# Patient Record
Sex: FEMALE | Race: BLACK OR AFRICAN AMERICAN | Employment: UNEMPLOYED | URBAN - METROPOLITAN AREA
[De-identification: names, ages, dates, MRNs, and addresses within clinical notes are randomized per-mention and may not be internally consistent; named-entity substitution may affect disease eponyms.]

---

## 2017-05-03 ENCOUNTER — HOSPITAL ENCOUNTER (EMERGENCY)
Age: 57
Discharge: HOME | End: 2017-05-03
Attending: INTERNAL MEDICINE
Payer: MEDICARE

## 2017-05-03 ENCOUNTER — APPOINTMENT (OUTPATIENT)
Dept: GENERAL RADIOLOGY | Age: 57
End: 2017-05-03
Attending: INTERNAL MEDICINE
Payer: MEDICARE

## 2017-05-03 VITALS
OXYGEN SATURATION: 98 % | RESPIRATION RATE: 18 BRPM | HEIGHT: 67 IN | SYSTOLIC BLOOD PRESSURE: 134 MMHG | HEART RATE: 93 BPM | WEIGHT: 125 LBS | TEMPERATURE: 97.9 F | BODY MASS INDEX: 19.62 KG/M2 | DIASTOLIC BLOOD PRESSURE: 97 MMHG

## 2017-05-03 DIAGNOSIS — S90.31XA CONTUSION OF RIGHT FOOT, INITIAL ENCOUNTER: Primary | ICD-10-CM

## 2017-05-03 PROCEDURE — 73630 X-RAY EXAM OF FOOT: CPT

## 2017-05-03 PROCEDURE — 99282 EMERGENCY DEPT VISIT SF MDM: CPT

## 2017-05-03 RX ORDER — LATANOPROST 50 UG/ML
1 SOLUTION/ DROPS OPHTHALMIC
COMMUNITY

## 2017-05-03 RX ORDER — NAPROXEN SODIUM 220 MG
440 TABLET ORAL
Qty: 20 TAB | Refills: 0 | Status: SHIPPED | OUTPATIENT
Start: 2017-05-03

## 2017-05-03 NOTE — ED PROVIDER NOTES
Patient is a 55 y.o. female presenting with foot pain. The history is provided by the patient. Foot Pain    This is a new (Acute Rt lateral foot pain after hitting it on the corner of a bed yesterday. ) problem. The current episode started yesterday. The problem occurs constantly. The problem has been gradually improving. The pain is present in the right foot. The pain is at a severity of 8/10. Pertinent negatives include no numbness, full range of motion, no stiffness, no tingling, no itching, no back pain and no neck pain. The symptoms are aggravated by palpation, contact and movement. Treatments tried: Aleve this AM. The treatment provided moderate relief. There has been a history of trauma. Past Medical History:   Diagnosis Date    Glaucoma     Hypertension     Ill-defined condition     legally blind/glaucoma    Osteoarthritis        Past Surgical History:   Procedure Laterality Date    HX GYN          HX ORTHOPAEDIC      Foot surgery         Family History:   Problem Relation Age of Onset    Hypertension Mother     Hypertension Father     Hypertension Maternal Grandmother     Hypertension Maternal Grandfather        Social History     Social History    Marital status: SINGLE     Spouse name: N/A    Number of children: N/A    Years of education: N/A     Occupational History    Not on file. Social History Main Topics    Smoking status: Current Every Day Smoker     Packs/day: 0.25    Smokeless tobacco: Not on file    Alcohol use Yes      Comment: socially    Drug use: No    Sexual activity: Not on file     Other Topics Concern    Not on file     Social History Narrative    ** Merged History Encounter **              ALLERGIES: Review of patient's allergies indicates no known allergies. Review of Systems   Constitutional: Negative for activity change, appetite change, chills, diaphoresis, fatigue and fever. HENT: Negative. Eyes: Negative. Respiratory: Negative. Negative for cough and shortness of breath. Cardiovascular: Negative. Negative for chest pain and leg swelling. Gastrointestinal: Negative. Negative for abdominal pain, diarrhea, nausea and vomiting. Genitourinary: Negative. Musculoskeletal: Positive for arthralgias and gait problem. Negative for back pain, joint swelling, myalgias, neck pain, neck stiffness and stiffness. Skin: Negative. Negative for color change, itching, pallor and wound. Neurological: Negative for tingling and numbness. Psychiatric/Behavioral: Negative. Vitals:    05/03/17 1818   BP: (!) 134/97   Pulse: 93   Resp: 18   Temp: 97.9 °F (36.6 °C)   SpO2: 98%   Weight: 56.7 kg (125 lb)   Height: 5' 7\" (1.702 m)            Physical Exam   Constitutional: She is oriented to person, place, and time. She appears well-developed and well-nourished. No distress. HENT:   Head: Normocephalic and atraumatic. Right Ear: External ear normal.   Left Ear: External ear normal.   Nose: Nose normal.   Eyes: Conjunctivae and EOM are normal. Pupils are equal, round, and reactive to light. Neck: Normal range of motion. Neck supple. Cardiovascular: Normal rate, regular rhythm, normal heart sounds and intact distal pulses. Pulmonary/Chest: Effort normal and breath sounds normal.   Abdominal: Soft. Bowel sounds are normal. There is no tenderness. Musculoskeletal: Normal range of motion. She exhibits tenderness. She exhibits no edema or deformity. Right ankle: Normal.        Right foot: There is tenderness. There is normal range of motion, no bony tenderness, no swelling, normal capillary refill, no crepitus, no deformity and no laceration. Left foot: Normal.        Feet:    Neurological: She is alert and oriented to person, place, and time. No cranial nerve deficit. Skin: Skin is warm and dry. She is not diaphoretic. Psychiatric: She has a normal mood and affect.  Her behavior is normal. Judgment and thought content normal.   Nursing note and vitals reviewed. MDM  Number of Diagnoses or Management Options  Contusion of right foot, initial encounter:   Diagnosis management comments: DDx: contusion, sprain, fx, dislocation    LABORATORY TESTS:  No results found for this or any previous visit (from the past 12 hour(s)). IMAGING RESULTS:  XR FOOT RT MIN 3 V   Final Result       MEDICATIONS GIVEN:  Medications - No data to display    IMPRESSION:  Contusion of right foot, initial encounter  (primary encounter diagnosis)    PLAN:  1. Current Discharge Medication List    START taking these medications    naproxen sodium (ALEVE) 220 mg tablet  Take 2 Tabs by mouth every eight (8) hours as needed for Pain. Qty: 20 Tab Refills: 0      CONTINUE these medications which have NOT CHANGED    latanoprost (XALATAN) 0.005 % ophthalmic solution  Administer 1 Drop to both eyes nightly.    gabapentin (NEURONTIN) 300 mg capsule  Take 300 mg by mouth three (3) times daily. amLODIPine (NORVASC) 5 mg tablet  Take 1 Tab by mouth daily. Qty: 30 Tab Refills: 0    diclofenac EC (VOLTAREN) 75 mg EC tablet  Take 1 Tab by mouth two (2) times daily as needed. Indications: OSTEOARTHRITIS  Qty: 30 Tab Refills: 0    meclizine (ANTIVERT) 25 mg tablet  Take 1 Tab by mouth three (3) times daily as needed. Indications: VERTIGO  Qty: 15 Tab Refills: 0    hydrochlorothiazide (HYDRODIURIL) 25 mg tablet  Take 25 mg by mouth daily. 2. Follow-up Information     Follow up With Details Comments 10 Brianda Alexandre, NP Schedule an appointment as soon as possible for a   visit in 1 week As needed, If symptoms worsen 5 [facility]  [phone]        Return to ED if worse               Patient Progress  Patient progress: stable    ED Course       Procedures    6:54 PM  I have discussed with patient their diagnosis, treatment, and follow up plan.  The patient agrees to follow up as outlined in discharge paperwork and also to return to the ED with any worsening.  Mitzi Thomas PA-C

## 2017-05-03 NOTE — ED TRIAGE NOTES
Right foot pain since hitting it yesterday on metal bedframe, no obvious deformities, last took naproxen earlier today/AM, PMH of multiple foot surgeries and fractures, pt also legally blind

## 2017-05-03 NOTE — DISCHARGE INSTRUCTIONS

## 2021-05-20 ENCOUNTER — APPOINTMENT (OUTPATIENT)
Dept: CT IMAGING | Age: 61
End: 2021-05-20
Attending: EMERGENCY MEDICINE
Payer: MEDICARE

## 2021-05-20 ENCOUNTER — HOSPITAL ENCOUNTER (EMERGENCY)
Age: 61
Discharge: HOME | End: 2021-05-20
Attending: EMERGENCY MEDICINE
Payer: MEDICARE

## 2021-05-20 VITALS
DIASTOLIC BLOOD PRESSURE: 95 MMHG | WEIGHT: 120 LBS | BODY MASS INDEX: 18.83 KG/M2 | HEART RATE: 82 BPM | HEIGHT: 67 IN | RESPIRATION RATE: 18 BRPM | SYSTOLIC BLOOD PRESSURE: 142 MMHG | TEMPERATURE: 97.8 F | OXYGEN SATURATION: 95 %

## 2021-05-20 DIAGNOSIS — R10.9 RIGHT FLANK PAIN: Primary | ICD-10-CM

## 2021-05-20 LAB
ALBUMIN SERPL-MCNC: 4.1 G/DL (ref 3.5–5)
ALBUMIN/GLOB SERPL: 0.8 {RATIO} (ref 1.1–2.2)
ALP SERPL-CCNC: 88 U/L (ref 45–117)
ALT SERPL-CCNC: 17 U/L (ref 12–78)
ANION GAP SERPL CALC-SCNC: 4 MMOL/L (ref 5–15)
APPEARANCE UR: CLEAR
AST SERPL-CCNC: 27 U/L (ref 15–37)
BASOPHILS # BLD: 0 K/UL (ref 0–0.1)
BASOPHILS NFR BLD: 1 % (ref 0–1)
BILIRUB SERPL-MCNC: 0.4 MG/DL (ref 0.2–1)
BILIRUB UR QL: NEGATIVE
BUN SERPL-MCNC: 12 MG/DL (ref 6–20)
BUN/CREAT SERPL: 13 (ref 12–20)
CALCIUM SERPL-MCNC: 9.6 MG/DL (ref 8.5–10.1)
CHLORIDE SERPL-SCNC: 104 MMOL/L (ref 97–108)
CO2 SERPL-SCNC: 30 MMOL/L (ref 21–32)
COLOR UR: NORMAL
COMMENT, HOLDF: NORMAL
CREAT SERPL-MCNC: 0.93 MG/DL (ref 0.55–1.02)
DIFFERENTIAL METHOD BLD: NORMAL
EOSINOPHIL # BLD: 0 K/UL (ref 0–0.4)
EOSINOPHIL NFR BLD: 1 % (ref 0–7)
ERYTHROCYTE [DISTWIDTH] IN BLOOD BY AUTOMATED COUNT: 12.2 % (ref 11.5–14.5)
GLOBULIN SER CALC-MCNC: 5 G/DL (ref 2–4)
GLUCOSE SERPL-MCNC: 89 MG/DL (ref 65–100)
GLUCOSE UR STRIP.AUTO-MCNC: NEGATIVE MG/DL
HCT VFR BLD AUTO: 42 % (ref 35–47)
HGB BLD-MCNC: 14.2 G/DL (ref 11.5–16)
HGB UR QL STRIP: NEGATIVE
IMM GRANULOCYTES # BLD AUTO: 0 K/UL (ref 0–0.04)
IMM GRANULOCYTES NFR BLD AUTO: 0 % (ref 0–0.5)
KETONES UR QL STRIP.AUTO: NEGATIVE MG/DL
LEUKOCYTE ESTERASE UR QL STRIP.AUTO: NEGATIVE
LIPASE SERPL-CCNC: 129 U/L (ref 73–393)
LYMPHOCYTES # BLD: 1.6 K/UL (ref 0.8–3.5)
LYMPHOCYTES NFR BLD: 42 % (ref 12–49)
MCH RBC QN AUTO: 32 PG (ref 26–34)
MCHC RBC AUTO-ENTMCNC: 33.8 G/DL (ref 30–36.5)
MCV RBC AUTO: 94.6 FL (ref 80–99)
MONOCYTES # BLD: 0.3 K/UL (ref 0–1)
MONOCYTES NFR BLD: 8 % (ref 5–13)
NEUTS SEG # BLD: 1.8 K/UL (ref 1.8–8)
NEUTS SEG NFR BLD: 48 % (ref 32–75)
NITRITE UR QL STRIP.AUTO: NEGATIVE
NRBC # BLD: 0 K/UL (ref 0–0.01)
NRBC BLD-RTO: 0 PER 100 WBC
PH UR STRIP: 7 [PH] (ref 5–8)
PLATELET # BLD AUTO: 371 K/UL (ref 150–400)
PMV BLD AUTO: 10.5 FL (ref 8.9–12.9)
POTASSIUM SERPL-SCNC: 4.8 MMOL/L (ref 3.5–5.1)
PROT SERPL-MCNC: 9.1 G/DL (ref 6.4–8.2)
PROT UR STRIP-MCNC: NEGATIVE MG/DL
RBC # BLD AUTO: 4.44 M/UL (ref 3.8–5.2)
SAMPLES BEING HELD,HOLD: NORMAL
SODIUM SERPL-SCNC: 138 MMOL/L (ref 136–145)
SP GR UR REFRACTOMETRY: 1.01 (ref 1–1.03)
UR CULT HOLD, URHOLD: NORMAL
UROBILINOGEN UR QL STRIP.AUTO: 0.2 EU/DL (ref 0.2–1)
WBC # BLD AUTO: 3.8 K/UL (ref 3.6–11)

## 2021-05-20 PROCEDURE — 83690 ASSAY OF LIPASE: CPT

## 2021-05-20 PROCEDURE — 85025 COMPLETE CBC W/AUTO DIFF WBC: CPT

## 2021-05-20 PROCEDURE — 74176 CT ABD & PELVIS W/O CONTRAST: CPT

## 2021-05-20 PROCEDURE — 99283 EMERGENCY DEPT VISIT LOW MDM: CPT

## 2021-05-20 PROCEDURE — 36415 COLL VENOUS BLD VENIPUNCTURE: CPT

## 2021-05-20 PROCEDURE — 80053 COMPREHEN METABOLIC PANEL: CPT

## 2021-05-20 PROCEDURE — 81003 URINALYSIS AUTO W/O SCOPE: CPT

## 2021-05-20 NOTE — ED PROVIDER NOTES
Date of Service:  2021    Patient:  Adriane Kelly    Chief Complaint:  Abdominal Pain       HPI:  dAriane Kelly is a 62 y.o.  female who presents for evaluation of right flank pain on/off for four days. Describes the pain as 10/10 when present. Right flank pain with radiation down to right inguinal region. No blood when she goes to the bathroom. No nausea or vomiting, no fevers. Some loose bowel movements associated with it. Patient endorsees ETOH use. Had pain once like this years ago with UTI, but does not currently have dysuria but endorses frequency. Past Medical History:   Diagnosis Date    Glaucoma     Hypertension     Ill-defined condition     legally blind/glaucoma    Osteoarthritis        Past Surgical History:   Procedure Laterality Date    HX GYN          HX ORTHOPAEDIC      Foot surgery         Family History:   Problem Relation Age of Onset    Hypertension Mother     Hypertension Father     Hypertension Maternal Grandmother     Hypertension Maternal Grandfather        Social History     Socioeconomic History    Marital status: SINGLE     Spouse name: Not on file    Number of children: Not on file    Years of education: Not on file    Highest education level: Not on file   Occupational History    Not on file   Tobacco Use    Smoking status: Current Every Day Smoker     Packs/day: 0.25    Smokeless tobacco: Never Used   Substance and Sexual Activity    Alcohol use: Yes     Comment: socially    Drug use: No    Sexual activity: Not on file   Other Topics Concern    Not on file   Social History Narrative    ** Merged History Encounter **          Social Determinants of Health     Financial Resource Strain:     Difficulty of Paying Living Expenses:    Food Insecurity:     Worried About Running Out of Food in the Last Year:     Ran Out of Food in the Last Year:    Transportation Needs:     Lack of Transportation (Medical):      Lack of Transportation (Non-Medical): Physical Activity:     Days of Exercise per Week:     Minutes of Exercise per Session:    Stress:     Feeling of Stress :    Social Connections:     Frequency of Communication with Friends and Family:     Frequency of Social Gatherings with Friends and Family:     Attends Shinto Services:     Active Member of Clubs or Organizations:     Attends Club or Organization Meetings:     Marital Status:    Intimate Partner Violence:     Fear of Current or Ex-Partner:     Emotionally Abused:     Physically Abused:     Sexually Abused: ALLERGIES: Patient has no known allergies. Review of Systems   Constitutional: Negative for fever. HENT: Negative for hearing loss. Eyes: Negative for visual disturbance. Respiratory: Negative for shortness of breath. Cardiovascular: Negative for chest pain. Gastrointestinal: Positive for abdominal pain and diarrhea. Negative for constipation, nausea and vomiting. Genitourinary: Positive for frequency. Negative for dysuria and flank pain. Musculoskeletal: Negative for back pain. Skin: Negative for rash. Neurological: Negative for dizziness and light-headedness. Psychiatric/Behavioral: Negative for confusion. Vitals:    05/20/21 1131   BP: (!) 142/95   Pulse: 82   Resp: 18   Temp: 97.8 °F (36.6 °C)   SpO2: 95%   Weight: 54.4 kg (120 lb)   Height: 5' 7\" (1.702 m)            Physical Exam  Vitals and nursing note reviewed. Constitutional:       Appearance: She is well-developed. HENT:      Head: Normocephalic and atraumatic. Mouth/Throat:      Mouth: Mucous membranes are moist.   Cardiovascular:      Rate and Rhythm: Normal rate. Heart sounds: Normal heart sounds. Pulmonary:      Effort: Pulmonary effort is normal. No respiratory distress. Abdominal:      General: Abdomen is flat. Bowel sounds are normal.      Palpations: Abdomen is soft. Tenderness: There is no abdominal tenderness (pain currently 0/10).  Negative signs include Saldivar's sign and McBurney's sign. Skin:     General: Skin is warm. Capillary Refill: Capillary refill takes less than 2 seconds. Findings: No rash. Neurological:      Mental Status: She is alert and oriented to person, place, and time. Psychiatric:         Mood and Affect: Mood normal.          MDM  Number of Diagnoses or Management Options       VITAL SIGNS:  No data found. LABS:  Recent Results (from the past 6 hour(s))   CBC WITH AUTOMATED DIFF    Collection Time: 05/20/21 12:08 PM   Result Value Ref Range    WBC 3.8 3.6 - 11.0 K/uL    RBC 4.44 3.80 - 5.20 M/uL    HGB 14.2 11.5 - 16.0 g/dL    HCT 42.0 35.0 - 47.0 %    MCV 94.6 80.0 - 99.0 FL    MCH 32.0 26.0 - 34.0 PG    MCHC 33.8 30.0 - 36.5 g/dL    RDW 12.2 11.5 - 14.5 %    PLATELET 268 786 - 450 K/uL    MPV 10.5 8.9 - 12.9 FL    NRBC 0.0 0  WBC    ABSOLUTE NRBC 0.00 0.00 - 0.01 K/uL    NEUTROPHILS 48 32 - 75 %    LYMPHOCYTES 42 12 - 49 %    MONOCYTES 8 5 - 13 %    EOSINOPHILS 1 0 - 7 %    BASOPHILS 1 0 - 1 %    IMMATURE GRANULOCYTES 0 0.0 - 0.5 %    ABS. NEUTROPHILS 1.8 1.8 - 8.0 K/UL    ABS. LYMPHOCYTES 1.6 0.8 - 3.5 K/UL    ABS. MONOCYTES 0.3 0.0 - 1.0 K/UL    ABS. EOSINOPHILS 0.0 0.0 - 0.4 K/UL    ABS. BASOPHILS 0.0 0.0 - 0.1 K/UL    ABS. IMM. GRANS. 0.0 0.00 - 0.04 K/UL    DF AUTOMATED     METABOLIC PANEL, COMPREHENSIVE    Collection Time: 05/20/21 12:08 PM   Result Value Ref Range    Sodium 138 136 - 145 mmol/L    Potassium 4.8 3.5 - 5.1 mmol/L    Chloride 104 97 - 108 mmol/L    CO2 30 21 - 32 mmol/L    Anion gap 4 (L) 5 - 15 mmol/L    Glucose 89 65 - 100 mg/dL    BUN 12 6 - 20 MG/DL    Creatinine 0.93 0.55 - 1.02 MG/DL    BUN/Creatinine ratio 13 12 - 20      GFR est AA >60 >60 ml/min/1.73m2    GFR est non-AA >60 >60 ml/min/1.73m2    Calcium 9.6 8.5 - 10.1 MG/DL    Bilirubin, total 0.4 0.2 - 1.0 MG/DL    ALT (SGPT) 17 12 - 78 U/L    AST (SGOT) 27 15 - 37 U/L    Alk.  phosphatase 88 45 - 117 U/L    Protein, total 9.1 (H) 6.4 - 8.2 g/dL    Albumin 4.1 3.5 - 5.0 g/dL    Globulin 5.0 (H) 2.0 - 4.0 g/dL    A-G Ratio 0.8 (L) 1.1 - 2.2     SAMPLES BEING HELD    Collection Time: 05/20/21 12:08 PM   Result Value Ref Range    SAMPLES BEING HELD 1 RED, 1 UA     COMMENT        Add-on orders for these samples will be processed based on acceptable specimen integrity and analyte stability, which may vary by analyte. URINE CULTURE HOLD SAMPLE    Collection Time: 05/20/21 12:08 PM    Specimen: Serum   Result Value Ref Range    Urine culture hold        Urine on hold in Microbiology dept for 2 days. If unpreserved urine is submitted, it cannot be used for addtional testing after 24 hours, recollection will be required. LIPASE    Collection Time: 05/20/21 12:08 PM   Result Value Ref Range    Lipase 129 73 - 393 U/L   URINALYSIS W/ RFLX MICROSCOPIC    Collection Time: 05/20/21 12:08 PM   Result Value Ref Range    Color YELLOW/STRAW      Appearance CLEAR CLEAR      Specific gravity 1.013 1.003 - 1.030      pH (UA) 7.0 5.0 - 8.0      Protein Negative NEG mg/dL    Glucose Negative NEG mg/dL    Ketone Negative NEG mg/dL    Bilirubin Negative NEG      Blood Negative NEG      Urobilinogen 0.2 0.2 - 1.0 EU/dL    Nitrites Negative NEG      Leukocyte Esterase Negative NEG          IMAGING:  CT ABD PELV WO CONT   Final Result   2 mm nonobstructing right renal calculus. Medications During Visit:  Medications - No data to display      DECISION MAKING:  Betsy Velazquez is a 62 y.o. female who comes in as above. Here, patient's physical exam grossly unremarkable. Work-up grossly unremarkable with exception of a small stone in her kidney. Possible that she did have a stone that since passed given the distribution of her discomfort and the lack of symptoms at this time. Patient to return as needed, follow-up with PCP      IMPRESSION:  1.  Right flank pain        DISPOSITION:  Discharged      Discharge Medication List as of 5/20/2021  2:40 PM Follow-up Information     Follow up With Specialties Details Why Contact Info    Provided PCP  Schedule an appointment as soon as possible for a visit               The patient is asked to follow-up with their primary care provider in the next several days. They are to call tomorrow for an appointment. The patient is asked to return promptly for any increased concerns or worsening of symptoms. They can return to this emergency department or any other emergency department.     Procedures

## 2023-05-21 RX ORDER — GABAPENTIN 300 MG/1
300 CAPSULE ORAL 3 TIMES DAILY
COMMUNITY

## 2023-05-21 RX ORDER — HYDROCHLOROTHIAZIDE 25 MG/1
25 TABLET ORAL DAILY
COMMUNITY

## 2023-05-21 RX ORDER — LATANOPROST 50 UG/ML
1 SOLUTION/ DROPS OPHTHALMIC
COMMUNITY

## 2023-05-21 RX ORDER — DICLOFENAC SODIUM 75 MG/1
75 TABLET, DELAYED RELEASE ORAL 2 TIMES DAILY PRN
COMMUNITY
Start: 2016-08-29

## 2023-05-21 RX ORDER — AMLODIPINE BESYLATE 5 MG/1
5 TABLET ORAL DAILY
COMMUNITY
Start: 2016-08-29

## 2023-05-21 RX ORDER — NAPROXEN SODIUM 220 MG
440 TABLET ORAL EVERY 8 HOURS PRN
COMMUNITY
Start: 2017-05-03

## 2023-05-21 RX ORDER — MECLIZINE HYDROCHLORIDE 25 MG/1
25 TABLET ORAL 3 TIMES DAILY PRN
COMMUNITY
Start: 2016-08-29

## 2025-01-01 ENCOUNTER — APPOINTMENT (OUTPATIENT)
Facility: HOSPITAL | Age: 65
DRG: 064 | End: 2025-01-01
Payer: MEDICARE

## 2025-01-01 ENCOUNTER — HOSPITAL ENCOUNTER (INPATIENT)
Facility: HOSPITAL | Age: 65
LOS: 3 days | DRG: 023 | End: 2025-03-06
Attending: ANESTHESIOLOGY | Admitting: ANESTHESIOLOGY
Payer: MEDICARE

## 2025-01-01 ENCOUNTER — HOSPITAL ENCOUNTER (INPATIENT)
Facility: HOSPITAL | Age: 65
Discharge: HOME | DRG: 023 | End: 2025-03-06
Attending: RADIOLOGY
Payer: MEDICARE

## 2025-01-01 ENCOUNTER — APPOINTMENT (OUTPATIENT)
Facility: HOSPITAL | Age: 65
DRG: 023 | End: 2025-01-01
Payer: MEDICARE

## 2025-01-01 ENCOUNTER — ANESTHESIA (OUTPATIENT)
Facility: HOSPITAL | Age: 65
End: 2025-01-01
Payer: MEDICARE

## 2025-01-01 ENCOUNTER — HOSPITAL ENCOUNTER (INPATIENT)
Facility: HOSPITAL | Age: 65
LOS: 1 days | Discharge: ANOTHER HOSPITAL | DRG: 064 | End: 2025-03-03
Attending: STUDENT IN AN ORGANIZED HEALTH CARE EDUCATION/TRAINING PROGRAM | Admitting: INTERNAL MEDICINE
Payer: MEDICARE

## 2025-01-01 ENCOUNTER — ANESTHESIA EVENT (OUTPATIENT)
Facility: HOSPITAL | Age: 65
End: 2025-01-01
Payer: MEDICARE

## 2025-01-01 ENCOUNTER — APPOINTMENT (OUTPATIENT)
Facility: HOSPITAL | Age: 65
DRG: 064 | End: 2025-01-01
Attending: INTERNAL MEDICINE
Payer: MEDICARE

## 2025-01-01 VITALS
HEART RATE: 60 BPM | SYSTOLIC BLOOD PRESSURE: 137 MMHG | OXYGEN SATURATION: 94 % | RESPIRATION RATE: 13 BRPM | DIASTOLIC BLOOD PRESSURE: 110 MMHG | TEMPERATURE: 93.8 F

## 2025-01-01 VITALS
DIASTOLIC BLOOD PRESSURE: 100 MMHG | RESPIRATION RATE: 14 BRPM | OXYGEN SATURATION: 99 % | HEART RATE: 56 BPM | SYSTOLIC BLOOD PRESSURE: 134 MMHG | TEMPERATURE: 97 F

## 2025-01-01 VITALS
DIASTOLIC BLOOD PRESSURE: 41 MMHG | BODY MASS INDEX: 19.76 KG/M2 | HEIGHT: 65 IN | OXYGEN SATURATION: 84 % | SYSTOLIC BLOOD PRESSURE: 52 MMHG | WEIGHT: 118.61 LBS | TEMPERATURE: 97.2 F

## 2025-01-01 DIAGNOSIS — F14.10 COCAINE ABUSE (HCC): ICD-10-CM

## 2025-01-01 DIAGNOSIS — I63.9 CEREBROVASCULAR ACCIDENT (CVA), UNSPECIFIED MECHANISM (HCC): Primary | ICD-10-CM

## 2025-01-01 DIAGNOSIS — I21.4 NSTEMI (NON-ST ELEVATED MYOCARDIAL INFARCTION) (HCC): ICD-10-CM

## 2025-01-01 DIAGNOSIS — I63.9 CEREBROVASCULAR ACCIDENT (CVA), UNSPECIFIED MECHANISM (HCC): ICD-10-CM

## 2025-01-01 DIAGNOSIS — I24.9 ACUTE CORONARY SYNDROME (HCC): Primary | ICD-10-CM

## 2025-01-01 DIAGNOSIS — I63.9 ACUTE CEREBROVASCULAR ACCIDENT (CVA) (HCC): ICD-10-CM

## 2025-01-01 LAB
ALBUMIN SERPL-MCNC: 3.2 G/DL (ref 3.5–5)
ALBUMIN/GLOB SERPL: 0.8 (ref 1.1–2.2)
ALP SERPL-CCNC: 95 U/L (ref 45–117)
ALT SERPL-CCNC: 16 U/L (ref 12–78)
AMPHET UR QL SCN: NEGATIVE
ANION GAP BLD CALC-SCNC: 12 (ref 10–20)
ANION GAP SERPL CALC-SCNC: 11 MMOL/L (ref 2–12)
ANION GAP SERPL CALC-SCNC: 14 MMOL/L (ref 2–12)
ANION GAP SERPL CALC-SCNC: 9 MMOL/L (ref 2–12)
ANION GAP SERPL CALC-SCNC: 9 MMOL/L (ref 2–12)
APPEARANCE UR: ABNORMAL
APTT PPP: 25.6 SEC (ref 22.1–31)
ARTERIAL PATENCY WRIST A: ABNORMAL
ARTERIAL PATENCY WRIST A: YES
ARTERIAL PATENCY WRIST A: YES
AST SERPL-CCNC: 34 U/L (ref 15–37)
BACTERIA SPEC CULT: NORMAL
BACTERIA SPEC CULT: NORMAL
BACTERIA URNS QL MICRO: ABNORMAL /HPF
BARBITURATES UR QL SCN: NEGATIVE
BASE DEFICIT BLD-SCNC: 2.4 MMOL/L
BASE DEFICIT BLD-SCNC: 6.9 MMOL/L
BASE DEFICIT BLDA-SCNC: 12.4 MMOL/L
BASE DEFICIT BLDA-SCNC: 3.9 MMOL/L
BASOPHILS # BLD: 0 K/UL (ref 0–0.1)
BASOPHILS NFR BLD: 0 % (ref 0–1)
BDY SITE: ABNORMAL
BENZODIAZ UR QL: NEGATIVE
BILIRUB SERPL-MCNC: 0.4 MG/DL (ref 0.2–1)
BILIRUB UR QL: NEGATIVE
BUN SERPL-MCNC: 15 MG/DL (ref 6–20)
BUN SERPL-MCNC: 17 MG/DL (ref 6–20)
BUN SERPL-MCNC: 22 MG/DL (ref 6–20)
BUN SERPL-MCNC: 24 MG/DL (ref 6–20)
BUN/CREAT SERPL: 10 (ref 12–20)
BUN/CREAT SERPL: 10 (ref 12–20)
BUN/CREAT SERPL: 21 (ref 12–20)
BUN/CREAT SERPL: 25 (ref 12–20)
CA-I BLD-MCNC: 1.16 MMOL/L (ref 1.15–1.33)
CALCIUM SERPL-MCNC: 7.5 MG/DL (ref 8.5–10.1)
CALCIUM SERPL-MCNC: 7.9 MG/DL (ref 8.5–10.1)
CALCIUM SERPL-MCNC: 8.1 MG/DL (ref 8.5–10.1)
CALCIUM SERPL-MCNC: 8.5 MG/DL (ref 8.5–10.1)
CANNABINOIDS UR QL SCN: POSITIVE
CHLORIDE BLD-SCNC: 109 MMOL/L (ref 100–111)
CHLORIDE SERPL-SCNC: 108 MMOL/L (ref 97–108)
CHLORIDE SERPL-SCNC: 109 MMOL/L (ref 97–108)
CHLORIDE SERPL-SCNC: 112 MMOL/L (ref 97–108)
CHLORIDE SERPL-SCNC: 114 MMOL/L (ref 97–108)
CHOLEST SERPL-MCNC: 131 MG/DL
CO2 BLD-SCNC: 25 MMOL/L (ref 22–29)
CO2 SERPL-SCNC: 16 MMOL/L (ref 21–32)
CO2 SERPL-SCNC: 19 MMOL/L (ref 21–32)
CO2 SERPL-SCNC: 21 MMOL/L (ref 21–32)
CO2 SERPL-SCNC: 23 MMOL/L (ref 21–32)
COCAINE UR QL SCN: POSITIVE
COLOR UR: ABNORMAL
CREAT SERPL-MCNC: 0.87 MG/DL (ref 0.55–1.02)
CREAT SERPL-MCNC: 1.14 MG/DL (ref 0.55–1.02)
CREAT SERPL-MCNC: 1.56 MG/DL (ref 0.55–1.02)
CREAT SERPL-MCNC: 1.69 MG/DL (ref 0.55–1.02)
CREAT UR-MCNC: 1.2 MG/DL (ref 0.6–1.3)
DIFFERENTIAL METHOD BLD: ABNORMAL
ECHO AO ASC DIAM: 3.5 CM
ECHO AO ASCENDING AORTA INDEX: 2.26 CM/M2
ECHO AO ROOT DIAM: 3.6 CM
ECHO AO ROOT INDEX: 2.32 CM/M2
ECHO AV AREA PEAK VELOCITY: 1.2 CM2
ECHO AV AREA/BSA PEAK VELOCITY: 0.8 CM2/M2
ECHO AV PEAK GRADIENT: 5 MMHG
ECHO AV PEAK VELOCITY: 1.2 M/S
ECHO AV VELOCITY RATIO: 0.58
ECHO BSA: 1.53 M2
ECHO EST RA PRESSURE: 10 MMHG
ECHO LA DIAMETER INDEX: 1.74 CM/M2
ECHO LA DIAMETER: 2.7 CM
ECHO LA TO AORTIC ROOT RATIO: 0.75
ECHO LV E' LATERAL VELOCITY: 11.9 CM/S
ECHO LV E' SEPTAL VELOCITY: 5.9 CM/S
ECHO LV EF PHYSICIAN: 65 %
ECHO LV FRACTIONAL SHORTENING: 24 % (ref 28–44)
ECHO LV INTERNAL DIMENSION DIASTOLE INDEX: 1.87 CM/M2
ECHO LV INTERNAL DIMENSION DIASTOLIC: 2.9 CM (ref 3.9–5.3)
ECHO LV INTERNAL DIMENSION SYSTOLIC INDEX: 1.42 CM/M2
ECHO LV INTERNAL DIMENSION SYSTOLIC: 2.2 CM
ECHO LV IVSD: 1.4 CM (ref 0.6–0.9)
ECHO LV MASS 2D: 104.2 G (ref 67–162)
ECHO LV MASS INDEX 2D: 67.2 G/M2 (ref 43–95)
ECHO LV POSTERIOR WALL DIASTOLIC: 1 CM (ref 0.6–0.9)
ECHO LV RELATIVE WALL THICKNESS RATIO: 0.69
ECHO LVOT AREA: 2 CM2
ECHO LVOT DIAM: 1.6 CM
ECHO LVOT PEAK GRADIENT: 2 MMHG
ECHO LVOT PEAK VELOCITY: 0.7 M/S
ECHO MV A VELOCITY: 0.19 M/S
ECHO MV AREA PHT: 4.3 CM2
ECHO MV E DECELERATION TIME (DT): 176.6 MS
ECHO MV E VELOCITY: 0.74 M/S
ECHO MV E/A RATIO: 3.89
ECHO MV E/E' LATERAL: 6.22
ECHO MV E/E' RATIO (AVERAGED): 9.38
ECHO MV E/E' SEPTAL: 12.54
ECHO MV PRESSURE HALF TIME (PHT): 51.2 MS
ECHO PV MAX VELOCITY: 0.5 M/S
ECHO PV PEAK GRADIENT: 1 MMHG
ECHO RIGHT VENTRICULAR SYSTOLIC PRESSURE (RVSP): 20 MMHG
ECHO RV FREE WALL PEAK S': 4.1 CM/S
ECHO RV INTERNAL DIMENSION: 4.2 CM
ECHO RV TAPSE: 0.5 CM (ref 1.7–?)
ECHO TV REGURGITANT MAX VELOCITY: 1.59 M/S
ECHO TV REGURGITANT PEAK GRADIENT: 10 MMHG
EKG ATRIAL RATE: 39 BPM
EKG ATRIAL RATE: 50 BPM
EKG ATRIAL RATE: 54 BPM
EKG DIAGNOSIS: NORMAL
EKG P AXIS: 43 DEGREES
EKG P AXIS: 87 DEGREES
EKG P-R INTERVAL: 156 MS
EKG Q-T INTERVAL: 512 MS
EKG Q-T INTERVAL: 520 MS
EKG Q-T INTERVAL: 566 MS
EKG Q-T INTERVAL: 592 MS
EKG QRS DURATION: 102 MS
EKG QRS DURATION: 124 MS
EKG QRS DURATION: 80 MS
EKG QRS DURATION: 80 MS
EKG QTC CALCULATION (BAZETT): 455 MS
EKG QTC CALCULATION (BAZETT): 532 MS
EKG QTC CALCULATION (BAZETT): 539 MS
EKG QTC CALCULATION (BAZETT): 552 MS
EKG R AXIS: -35 DEGREES
EKG R AXIS: -39 DEGREES
EKG R AXIS: 43 DEGREES
EKG R AXIS: 85 DEGREES
EKG T AXIS: -71 DEGREES
EKG T AXIS: -71 DEGREES
EKG T AXIS: 263 DEGREES
EKG T AXIS: 87 DEGREES
EKG VENTRICULAR RATE: 39 BPM
EKG VENTRICULAR RATE: 50 BPM
EKG VENTRICULAR RATE: 65 BPM
EKG VENTRICULAR RATE: 68 BPM
EOSINOPHIL # BLD: 0.09 K/UL (ref 0–0.4)
EOSINOPHIL NFR BLD: 1 % (ref 0–7)
EPITH CASTS URNS QL MICRO: ABNORMAL /LPF
ERYTHROCYTE [DISTWIDTH] IN BLOOD BY AUTOMATED COUNT: 12.6 % (ref 11.5–14.5)
ERYTHROCYTE [DISTWIDTH] IN BLOOD BY AUTOMATED COUNT: 12.8 % (ref 11.5–14.5)
ERYTHROCYTE [DISTWIDTH] IN BLOOD BY AUTOMATED COUNT: 13.1 % (ref 11.5–14.5)
ERYTHROCYTE [DISTWIDTH] IN BLOOD BY AUTOMATED COUNT: 13.1 % (ref 11.5–14.5)
ERYTHROCYTE [DISTWIDTH] IN BLOOD BY AUTOMATED COUNT: 13.2 % (ref 11.5–14.5)
EST. AVERAGE GLUCOSE BLD GHB EST-MCNC: 103 MG/DL
ETHANOL SERPL-MCNC: <10 MG/DL (ref 0–0.08)
FIO2 ON VENT: 100 %
GAS FLOW.O2 O2 DELIVERY SYS: ABNORMAL
GAS FLOW.O2 SETTING OXYMISER: 15
GAS FLOW.O2 SETTING OXYMISER: 18 BPM
GLOBULIN SER CALC-MCNC: 4.1 G/DL (ref 2–4)
GLUCOSE BLD STRIP.AUTO-MCNC: 114 MG/DL (ref 65–117)
GLUCOSE BLD STRIP.AUTO-MCNC: 115 MG/DL (ref 65–117)
GLUCOSE BLD STRIP.AUTO-MCNC: 135 MG/DL (ref 65–117)
GLUCOSE BLD STRIP.AUTO-MCNC: 137 MG/DL (ref 65–117)
GLUCOSE BLD STRIP.AUTO-MCNC: 139 MG/DL (ref 65–117)
GLUCOSE BLD STRIP.AUTO-MCNC: 152 MG/DL (ref 65–117)
GLUCOSE BLD STRIP.AUTO-MCNC: 158 MG/DL (ref 65–117)
GLUCOSE BLD STRIP.AUTO-MCNC: 160 MG/DL (ref 65–117)
GLUCOSE BLD STRIP.AUTO-MCNC: 165 MG/DL (ref 65–117)
GLUCOSE BLD STRIP.AUTO-MCNC: 186 MG/DL (ref 74–99)
GLUCOSE SERPL-MCNC: 114 MG/DL (ref 65–100)
GLUCOSE SERPL-MCNC: 120 MG/DL (ref 65–100)
GLUCOSE SERPL-MCNC: 178 MG/DL (ref 65–100)
GLUCOSE SERPL-MCNC: 225 MG/DL (ref 65–100)
GLUCOSE UR STRIP.AUTO-MCNC: NEGATIVE MG/DL
GRAN CASTS URNS QL MICRO: ABNORMAL /LPF
HBA1C MFR BLD: 5.2 % (ref 4–5.6)
HCO3 BLD-SCNC: 16.2 MMOL/L (ref 21–28)
HCO3 BLDA-SCNC: 12 MMOL/L (ref 22–26)
HCO3 BLDA-SCNC: 19 MMOL/L (ref 22–26)
HCO3 BLDA-SCNC: 25 MMOL/L
HCT VFR BLD AUTO: 26.4 % (ref 35–47)
HCT VFR BLD AUTO: 27.1 % (ref 35–47)
HCT VFR BLD AUTO: 31.1 % (ref 35–47)
HCT VFR BLD AUTO: 32.6 % (ref 35–47)
HCT VFR BLD AUTO: 35 % (ref 35–47)
HDLC SERPL-MCNC: 27 MG/DL
HDLC SERPL: 4.9 (ref 0–5)
HGB BLD-MCNC: 11.2 G/DL (ref 11.5–16)
HGB BLD-MCNC: 8.4 G/DL (ref 11.5–16)
HGB BLD-MCNC: 8.8 G/DL (ref 11.5–16)
HGB BLD-MCNC: 9.8 G/DL (ref 11.5–16)
HGB BLD-MCNC: 9.9 G/DL (ref 11.5–16)
HGB UR QL STRIP: NEGATIVE
IMM GRANULOCYTES # BLD AUTO: 0 K/UL (ref 0–0.04)
IMM GRANULOCYTES NFR BLD AUTO: 0 % (ref 0–0.5)
INR PPP: 1 (ref 0.9–1.1)
INR PPP: 1 (ref 0.9–1.1)
KETONES UR QL STRIP.AUTO: NEGATIVE MG/DL
LACTATE BLD-SCNC: 4.06 MMOL/L (ref 0.4–2)
LDLC SERPL CALC-MCNC: 76.4 MG/DL (ref 0–100)
LEUKOCYTE ESTERASE UR QL STRIP.AUTO: ABNORMAL
LYMPHOCYTES # BLD: 2.7 K/UL (ref 0.8–3.5)
LYMPHOCYTES NFR BLD: 30 % (ref 12–49)
Lab: ABNORMAL
MAGNESIUM SERPL-MCNC: 2.1 MG/DL (ref 1.6–2.4)
MAGNESIUM SERPL-MCNC: 2.1 MG/DL (ref 1.6–2.4)
MAGNESIUM SERPL-MCNC: 2.3 MG/DL (ref 1.6–2.4)
MCH RBC QN AUTO: 30 PG (ref 26–34)
MCH RBC QN AUTO: 30.1 PG (ref 26–34)
MCH RBC QN AUTO: 30.6 PG (ref 26–34)
MCH RBC QN AUTO: 30.7 PG (ref 26–34)
MCH RBC QN AUTO: 30.8 PG (ref 26–34)
MCHC RBC AUTO-ENTMCNC: 30.4 G/DL (ref 30–36.5)
MCHC RBC AUTO-ENTMCNC: 31.5 G/DL (ref 30–36.5)
MCHC RBC AUTO-ENTMCNC: 31.8 G/DL (ref 30–36.5)
MCHC RBC AUTO-ENTMCNC: 32 G/DL (ref 30–36.5)
MCHC RBC AUTO-ENTMCNC: 32.5 G/DL (ref 30–36.5)
MCV RBC AUTO: 100.9 FL (ref 80–99)
MCV RBC AUTO: 94.1 FL (ref 80–99)
MCV RBC AUTO: 94.3 FL (ref 80–99)
MCV RBC AUTO: 95.4 FL (ref 80–99)
MCV RBC AUTO: 96.2 FL (ref 80–99)
METHADONE UR QL: NEGATIVE
MONOCYTES # BLD: 0.45 K/UL (ref 0–1)
MONOCYTES NFR BLD: 5 % (ref 5–13)
MUCOUS THREADS URNS QL MICRO: ABNORMAL /LPF
NEUTS SEG # BLD: 5.76 K/UL (ref 1.8–8)
NEUTS SEG NFR BLD: 64 % (ref 32–75)
NITRITE UR QL STRIP.AUTO: NEGATIVE
NRBC # BLD: 0 K/UL (ref 0–0.01)
NRBC # BLD: 0.02 K/UL (ref 0–0.01)
NRBC BLD-RTO: 0 PER 100 WBC
NRBC BLD-RTO: 0.2 PER 100 WBC
NT PRO BNP: 609 PG/ML
O2/TOTAL GAS SETTING VFR VENT: 35 %
OPIATES UR QL: NEGATIVE
PCO2 BLD: 24.1 MMHG (ref 35–48)
PCO2 BLDA: 27 MMHG (ref 35–45)
PCO2 BLDA: 31 MMHG (ref 35–45)
PCO2 BLDV: 53.1 MMHG (ref 41–51)
PCP UR QL: NEGATIVE
PEEP RESPIRATORY: 5
PEEP RESPIRATORY: 5 CMH2O
PH BLD: 7.43 (ref 7.35–7.45)
PH BLDA: 7.29 (ref 7.35–7.45)
PH BLDA: 7.42 (ref 7.35–7.45)
PH BLDV: 7.28 (ref 7.32–7.42)
PH UR STRIP: 5.5 (ref 5–8)
PHOSPHATE SERPL-MCNC: 1.5 MG/DL (ref 2.6–4.7)
PHOSPHATE SERPL-MCNC: 2.3 MG/DL (ref 2.6–4.7)
PLATELET # BLD AUTO: 142 K/UL (ref 150–400)
PLATELET # BLD AUTO: 170 K/UL (ref 150–400)
PLATELET # BLD AUTO: 233 K/UL (ref 150–400)
PLATELET # BLD AUTO: 235 K/UL (ref 150–400)
PLATELET # BLD AUTO: 244 K/UL (ref 150–400)
PMV BLD AUTO: 10 FL (ref 8.9–12.9)
PMV BLD AUTO: 10.4 FL (ref 8.9–12.9)
PMV BLD AUTO: 9.4 FL (ref 8.9–12.9)
PMV BLD AUTO: 9.4 FL (ref 8.9–12.9)
PMV BLD AUTO: 9.9 FL (ref 8.9–12.9)
PO2 BLD: 165 MMHG (ref 83–108)
PO2 BLDA: 228 MMHG (ref 80–100)
PO2 BLDA: 473 MMHG (ref 80–100)
PO2 BLDV: <27 MMHG (ref 25–40)
POTASSIUM BLD-SCNC: 2.9 MMOL/L (ref 3.5–5.5)
POTASSIUM SERPL-SCNC: 3.4 MMOL/L (ref 3.5–5.1)
POTASSIUM SERPL-SCNC: 3.6 MMOL/L (ref 3.5–5.1)
POTASSIUM SERPL-SCNC: 3.6 MMOL/L (ref 3.5–5.1)
POTASSIUM SERPL-SCNC: 3.7 MMOL/L (ref 3.5–5.1)
PROCALCITONIN SERPL-MCNC: 0.05 NG/ML
PROT SERPL-MCNC: 7.3 G/DL (ref 6.4–8.2)
PROT UR STRIP-MCNC: ABNORMAL MG/DL
PROTHROMBIN TIME: 10.5 SEC (ref 9.2–11.2)
PROTHROMBIN TIME: 10.7 SEC (ref 9.2–11.2)
RBC # BLD AUTO: 2.8 M/UL (ref 3.8–5.2)
RBC # BLD AUTO: 2.88 M/UL (ref 3.8–5.2)
RBC # BLD AUTO: 3.23 M/UL (ref 3.8–5.2)
RBC # BLD AUTO: 3.26 M/UL (ref 3.8–5.2)
RBC # BLD AUTO: 3.64 M/UL (ref 3.8–5.2)
RBC #/AREA URNS HPF: ABNORMAL /HPF (ref 0–5)
RBC MORPH BLD: ABNORMAL
SAO2 % BLD: 100 % (ref 92–97)
SAO2 % BLD: 99 % (ref 92–97)
SAO2 % BLD: 99.6 % (ref 92–97)
SAO2% DEVICE SAO2% SENSOR NAME: ABNORMAL
SAO2% DEVICE SAO2% SENSOR NAME: ABNORMAL
SERVICE CMNT-IMP: ABNORMAL
SERVICE CMNT-IMP: NORMAL
SODIUM BLD-SCNC: 146 MMOL/L (ref 136–145)
SODIUM SERPL-SCNC: 139 MMOL/L (ref 136–145)
SODIUM SERPL-SCNC: 140 MMOL/L (ref 136–145)
SODIUM SERPL-SCNC: 142 MMOL/L (ref 136–145)
SODIUM SERPL-SCNC: 144 MMOL/L (ref 136–145)
SP GR UR REFRACTOMETRY: 1.01
SPECIMEN SITE: ABNORMAL
SPECIMEN TYPE: ABNORMAL
T4 FREE SERPL-MCNC: 1.2 NG/DL (ref 0.8–1.5)
THERAPEUTIC RANGE: NORMAL SECS (ref 58–77)
TRIGL SERPL-MCNC: 138 MG/DL
TROPONIN I SERPL HS-MCNC: 1038 NG/L (ref 0–51)
TROPONIN I SERPL HS-MCNC: ABNORMAL NG/L (ref 0–51)
TSH SERPL DL<=0.05 MIU/L-ACNC: 6.32 UIU/ML (ref 0.36–3.74)
UFH PPP CHRO-ACNC: 0.34 IU/ML
UFH PPP CHRO-ACNC: 0.38 IU/ML
UFH PPP CHRO-ACNC: <0.1 IU/ML
UFH PPP CHRO-ACNC: <0.1 IU/ML
URINE CULTURE IF INDICATED: ABNORMAL
UROBILINOGEN UR QL STRIP.AUTO: 0.2 EU/DL (ref 0.2–1)
VENTILATION MODE VENT: ABNORMAL
VLDLC SERPL CALC-MCNC: 27.6 MG/DL
VT SETTING VENT: 380 ML
WBC # BLD AUTO: 10.6 K/UL (ref 3.6–11)
WBC # BLD AUTO: 11.4 K/UL (ref 3.6–11)
WBC # BLD AUTO: 13.2 K/UL (ref 3.6–11)
WBC # BLD AUTO: 9 K/UL (ref 3.6–11)
WBC # BLD AUTO: 9.6 K/UL (ref 3.6–11)
WBC URNS QL MICRO: ABNORMAL /HPF (ref 0–4)

## 2025-01-01 PROCEDURE — 2500000003 HC RX 250 WO HCPCS: Performed by: INTERNAL MEDICINE

## 2025-01-01 PROCEDURE — 93010 ELECTROCARDIOGRAM REPORT: CPT | Performed by: INTERNAL MEDICINE

## 2025-01-01 PROCEDURE — 82803 BLOOD GASES ANY COMBINATION: CPT

## 2025-01-01 PROCEDURE — 82330 ASSAY OF CALCIUM: CPT

## 2025-01-01 PROCEDURE — 2500000003 HC RX 250 WO HCPCS

## 2025-01-01 PROCEDURE — 82962 GLUCOSE BLOOD TEST: CPT

## 2025-01-01 PROCEDURE — 2500000003 HC RX 250 WO HCPCS: Performed by: PHYSICIAN ASSISTANT

## 2025-01-01 PROCEDURE — 6360000002 HC RX W HCPCS: Performed by: INTERNAL MEDICINE

## 2025-01-01 PROCEDURE — 0BH17EZ INSERTION OF ENDOTRACHEAL AIRWAY INTO TRACHEA, VIA NATURAL OR ARTIFICIAL OPENING: ICD-10-PCS | Performed by: INTERNAL MEDICINE

## 2025-01-01 PROCEDURE — 84439 ASSAY OF FREE THYROXINE: CPT

## 2025-01-01 PROCEDURE — 80061 LIPID PANEL: CPT

## 2025-01-01 PROCEDURE — 2580000003 HC RX 258: Performed by: NURSE ANESTHETIST, CERTIFIED REGISTERED

## 2025-01-01 PROCEDURE — 80307 DRUG TEST PRSMV CHEM ANLYZR: CPT

## 2025-01-01 PROCEDURE — 36620 INSERTION CATHETER ARTERY: CPT

## 2025-01-01 PROCEDURE — 94002 VENT MGMT INPAT INIT DAY: CPT

## 2025-01-01 PROCEDURE — 2000000000 HC ICU R&B

## 2025-01-01 PROCEDURE — 2580000003 HC RX 258: Performed by: NURSE PRACTITIONER

## 2025-01-01 PROCEDURE — 70498 CT ANGIOGRAPHY NECK: CPT

## 2025-01-01 PROCEDURE — 99233 SBSQ HOSP IP/OBS HIGH 50: CPT | Performed by: STUDENT IN AN ORGANIZED HEALTH CARE EDUCATION/TRAINING PROGRAM

## 2025-01-01 PROCEDURE — 2500000003 HC RX 250 WO HCPCS: Performed by: NURSE PRACTITIONER

## 2025-01-01 PROCEDURE — 2580000003 HC RX 258: Performed by: RADIOLOGY

## 2025-01-01 PROCEDURE — 96375 TX/PRO/DX INJ NEW DRUG ADDON: CPT

## 2025-01-01 PROCEDURE — 99233 SBSQ HOSP IP/OBS HIGH 50: CPT

## 2025-01-01 PROCEDURE — 99232 SBSQ HOSP IP/OBS MODERATE 35: CPT | Performed by: STUDENT IN AN ORGANIZED HEALTH CARE EDUCATION/TRAINING PROGRAM

## 2025-01-01 PROCEDURE — 6360000002 HC RX W HCPCS: Performed by: NURSE PRACTITIONER

## 2025-01-01 PROCEDURE — 85520 HEPARIN ASSAY: CPT

## 2025-01-01 PROCEDURE — 85027 COMPLETE CBC AUTOMATED: CPT

## 2025-01-01 PROCEDURE — 95816 EEG AWAKE AND DROWSY: CPT | Performed by: PSYCHIATRY & NEUROLOGY

## 2025-01-01 PROCEDURE — 36415 COLL VENOUS BLD VENIPUNCTURE: CPT

## 2025-01-01 PROCEDURE — 93306 TTE W/DOPPLER COMPLETE: CPT | Performed by: INTERNAL MEDICINE

## 2025-01-01 PROCEDURE — 84100 ASSAY OF PHOSPHORUS: CPT

## 2025-01-01 PROCEDURE — 83880 ASSAY OF NATRIURETIC PEPTIDE: CPT

## 2025-01-01 PROCEDURE — 84484 ASSAY OF TROPONIN QUANT: CPT

## 2025-01-01 PROCEDURE — 74018 RADEX ABDOMEN 1 VIEW: CPT

## 2025-01-01 PROCEDURE — 85025 COMPLETE CBC W/AUTO DIFF WBC: CPT

## 2025-01-01 PROCEDURE — 83036 HEMOGLOBIN GLYCOSYLATED A1C: CPT

## 2025-01-01 PROCEDURE — 2580000003 HC RX 258: Performed by: STUDENT IN AN ORGANIZED HEALTH CARE EDUCATION/TRAINING PROGRAM

## 2025-01-01 PROCEDURE — 4A03X5D MEASUREMENT OF ARTERIAL FLOW, INTRACRANIAL, EXTERNAL APPROACH: ICD-10-PCS | Performed by: STUDENT IN AN ORGANIZED HEALTH CARE EDUCATION/TRAINING PROGRAM

## 2025-01-01 PROCEDURE — XX20X89 MONITORING OF BRAIN ELECTRICAL ACTIVITY, COMPUTER-AIDED DETECTION AND NOTIFICATION, NEW TECHNOLOGY GROUP 9: ICD-10-PCS | Performed by: INTERNAL MEDICINE

## 2025-01-01 PROCEDURE — 84145 PROCALCITONIN (PCT): CPT

## 2025-01-01 PROCEDURE — 3E043XZ INTRODUCTION OF VASOPRESSOR INTO CENTRAL VEIN, PERCUTANEOUS APPROACH: ICD-10-PCS | Performed by: INTERNAL MEDICINE

## 2025-01-01 PROCEDURE — 71045 X-RAY EXAM CHEST 1 VIEW: CPT

## 2025-01-01 PROCEDURE — 03HY32Z INSERTION OF MONITORING DEVICE INTO UPPER ARTERY, PERCUTANEOUS APPROACH: ICD-10-PCS | Performed by: INTERNAL MEDICINE

## 2025-01-01 PROCEDURE — 6360000002 HC RX W HCPCS: Performed by: PHYSICIAN ASSISTANT

## 2025-01-01 PROCEDURE — 4500000002 HC ER NO CHARGE

## 2025-01-01 PROCEDURE — 36556 INSERT NON-TUNNEL CV CATH: CPT

## 2025-01-01 PROCEDURE — 87040 BLOOD CULTURE FOR BACTERIA: CPT

## 2025-01-01 PROCEDURE — 6360000002 HC RX W HCPCS: Performed by: HOSPITALIST

## 2025-01-01 PROCEDURE — 99291 CRITICAL CARE FIRST HOUR: CPT | Performed by: NURSE PRACTITIONER

## 2025-01-01 PROCEDURE — 6370000000 HC RX 637 (ALT 250 FOR IP): Performed by: PHYSICIAN ASSISTANT

## 2025-01-01 PROCEDURE — 03CG3Z7 EXTIRPATION OF MATTER FROM INTRACRANIAL ARTERY USING STENT RETRIEVER, PERCUTANEOUS APPROACH: ICD-10-PCS | Performed by: RADIOLOGY

## 2025-01-01 PROCEDURE — 95822 EEG COMA OR SLEEP ONLY: CPT

## 2025-01-01 PROCEDURE — 94003 VENT MGMT INPAT SUBQ DAY: CPT

## 2025-01-01 PROCEDURE — 5A1935Z RESPIRATORY VENTILATION, LESS THAN 24 CONSECUTIVE HOURS: ICD-10-PCS | Performed by: STUDENT IN AN ORGANIZED HEALTH CARE EDUCATION/TRAINING PROGRAM

## 2025-01-01 PROCEDURE — 82077 ASSAY SPEC XCP UR&BREATH IA: CPT

## 2025-01-01 PROCEDURE — 82947 ASSAY GLUCOSE BLOOD QUANT: CPT

## 2025-01-01 PROCEDURE — 31500 INSERT EMERGENCY AIRWAY: CPT

## 2025-01-01 PROCEDURE — 85610 PROTHROMBIN TIME: CPT

## 2025-01-01 PROCEDURE — 84132 ASSAY OF SERUM POTASSIUM: CPT

## 2025-01-01 PROCEDURE — 70450 CT HEAD/BRAIN W/O DYE: CPT

## 2025-01-01 PROCEDURE — 36600 WITHDRAWAL OF ARTERIAL BLOOD: CPT

## 2025-01-01 PROCEDURE — 6370000000 HC RX 637 (ALT 250 FOR IP): Performed by: NURSE PRACTITIONER

## 2025-01-01 PROCEDURE — 83735 ASSAY OF MAGNESIUM: CPT

## 2025-01-01 PROCEDURE — 2580000003 HC RX 258: Performed by: PHYSICIAN ASSISTANT

## 2025-01-01 PROCEDURE — 2700000000 HC OXYGEN THERAPY PER DAY

## 2025-01-01 PROCEDURE — 1100000000 HC RM PRIVATE

## 2025-01-01 PROCEDURE — 61645 PERQ ART M-THROMBECT &/NFS: CPT | Performed by: RADIOLOGY

## 2025-01-01 PROCEDURE — 3700000000 HC ANESTHESIA ATTENDED CARE

## 2025-01-01 PROCEDURE — 99223 1ST HOSP IP/OBS HIGH 75: CPT | Performed by: STUDENT IN AN ORGANIZED HEALTH CARE EDUCATION/TRAINING PROGRAM

## 2025-01-01 PROCEDURE — 80053 COMPREHEN METABOLIC PANEL: CPT

## 2025-01-01 PROCEDURE — 99232 SBSQ HOSP IP/OBS MODERATE 35: CPT | Performed by: NURSE PRACTITIONER

## 2025-01-01 PROCEDURE — 84443 ASSAY THYROID STIM HORMONE: CPT

## 2025-01-01 PROCEDURE — 93306 TTE W/DOPPLER COMPLETE: CPT

## 2025-01-01 PROCEDURE — 5A1945Z RESPIRATORY VENTILATION, 24-96 CONSECUTIVE HOURS: ICD-10-PCS | Performed by: INTERNAL MEDICINE

## 2025-01-01 PROCEDURE — 85730 THROMBOPLASTIN TIME PARTIAL: CPT

## 2025-01-01 PROCEDURE — 99232 SBSQ HOSP IP/OBS MODERATE 35: CPT | Performed by: INTERNAL MEDICINE

## 2025-01-01 PROCEDURE — 93005 ELECTROCARDIOGRAM TRACING: CPT | Performed by: PHYSICIAN ASSISTANT

## 2025-01-01 PROCEDURE — 51702 INSERT TEMP BLADDER CATH: CPT

## 2025-01-01 PROCEDURE — 99223 1ST HOSP IP/OBS HIGH 75: CPT | Performed by: INTERNAL MEDICINE

## 2025-01-01 PROCEDURE — 6360000002 HC RX W HCPCS: Performed by: RADIOLOGY

## 2025-01-01 PROCEDURE — 80048 BASIC METABOLIC PNL TOTAL CA: CPT

## 2025-01-01 PROCEDURE — 03CG3ZZ EXTIRPATION OF MATTER FROM INTRACRANIAL ARTERY, PERCUTANEOUS APPROACH: ICD-10-PCS | Performed by: RADIOLOGY

## 2025-01-01 PROCEDURE — 70551 MRI BRAIN STEM W/O DYE: CPT

## 2025-01-01 PROCEDURE — 6360000004 HC RX CONTRAST MEDICATION: Performed by: STUDENT IN AN ORGANIZED HEALTH CARE EDUCATION/TRAINING PROGRAM

## 2025-01-01 PROCEDURE — 2500000003 HC RX 250 WO HCPCS: Performed by: NURSE ANESTHETIST, CERTIFIED REGISTERED

## 2025-01-01 PROCEDURE — 99291 CRITICAL CARE FIRST HOUR: CPT

## 2025-01-01 PROCEDURE — 3700000001 HC ADD 15 MINUTES (ANESTHESIA)

## 2025-01-01 PROCEDURE — 81001 URINALYSIS AUTO W/SCOPE: CPT

## 2025-01-01 PROCEDURE — 84295 ASSAY OF SERUM SODIUM: CPT

## 2025-01-01 PROCEDURE — 6360000002 HC RX W HCPCS: Performed by: STUDENT IN AN ORGANIZED HEALTH CARE EDUCATION/TRAINING PROGRAM

## 2025-01-01 PROCEDURE — 2720000010 IR MECHANICAL ART THROMBECTOMY INTRACRANIAL

## 2025-01-01 PROCEDURE — 93308 TTE F-UP OR LMTD: CPT

## 2025-01-01 PROCEDURE — 93005 ELECTROCARDIOGRAM TRACING: CPT | Performed by: NURSE PRACTITIONER

## 2025-01-01 PROCEDURE — 96365 THER/PROPH/DIAG IV INF INIT: CPT

## 2025-01-01 PROCEDURE — 0042T CT BRAIN PERFUSION: CPT

## 2025-01-01 PROCEDURE — 6360000002 HC RX W HCPCS: Performed by: NURSE ANESTHETIST, CERTIFIED REGISTERED

## 2025-01-01 PROCEDURE — 6360000004 HC RX CONTRAST MEDICATION: Performed by: RADIOLOGY

## 2025-01-01 PROCEDURE — C1887 CATHETER, GUIDING: HCPCS

## 2025-01-01 PROCEDURE — 06HY33Z INSERTION OF INFUSION DEVICE INTO LOWER VEIN, PERCUTANEOUS APPROACH: ICD-10-PCS | Performed by: INTERNAL MEDICINE

## 2025-01-01 PROCEDURE — 96366 THER/PROPH/DIAG IV INF ADDON: CPT

## 2025-01-01 RX ORDER — 0.9 % SODIUM CHLORIDE 0.9 %
1000 INTRAVENOUS SOLUTION INTRAVENOUS ONCE
Status: COMPLETED | OUTPATIENT
Start: 2025-01-01 | End: 2025-01-01

## 2025-01-01 RX ORDER — LORAZEPAM 2 MG/ML
4 INJECTION INTRAMUSCULAR
Status: DISPENSED | OUTPATIENT
Start: 2025-01-01 | End: 2025-01-01

## 2025-01-01 RX ORDER — ACETAMINOPHEN 325 MG/1
650 TABLET ORAL EVERY 6 HOURS PRN
Status: DISCONTINUED | OUTPATIENT
Start: 2025-01-01 | End: 2025-01-01 | Stop reason: HOSPADM

## 2025-01-01 RX ORDER — LIDOCAINE HYDROCHLORIDE 10 MG/ML
INJECTION, SOLUTION EPIDURAL; INFILTRATION; INTRACAUDAL; PERINEURAL PRN
Status: COMPLETED | OUTPATIENT
Start: 2025-01-01 | End: 2025-01-01

## 2025-01-01 RX ORDER — KETAMINE HYDROCHLORIDE 50 MG/ML
50 INJECTION, SOLUTION INTRAMUSCULAR; INTRAVENOUS ONCE
Status: COMPLETED | OUTPATIENT
Start: 2025-01-01 | End: 2025-01-01

## 2025-01-01 RX ORDER — DEXTROSE MONOHYDRATE 100 MG/ML
INJECTION, SOLUTION INTRAVENOUS CONTINUOUS PRN
Status: DISCONTINUED | OUTPATIENT
Start: 2025-01-01 | End: 2025-01-01 | Stop reason: HOSPADM

## 2025-01-01 RX ORDER — LEVETIRACETAM 500 MG/5ML
1500 INJECTION, SOLUTION, CONCENTRATE INTRAVENOUS ONCE
Status: COMPLETED | OUTPATIENT
Start: 2025-01-01 | End: 2025-01-01

## 2025-01-01 RX ORDER — DOPAMINE HYDROCHLORIDE 320 MG/100ML
1-20 INJECTION, SOLUTION INTRAVENOUS CONTINUOUS
Status: DISCONTINUED | OUTPATIENT
Start: 2025-01-01 | End: 2025-01-01 | Stop reason: HOSPADM

## 2025-01-01 RX ORDER — ONDANSETRON 2 MG/ML
4 INJECTION INTRAMUSCULAR; INTRAVENOUS EVERY 6 HOURS PRN
Status: DISCONTINUED | OUTPATIENT
Start: 2025-01-01 | End: 2025-01-01 | Stop reason: HOSPADM

## 2025-01-01 RX ORDER — FENTANYL CITRATE 50 UG/ML
50 INJECTION, SOLUTION INTRAMUSCULAR; INTRAVENOUS
Status: DISCONTINUED | OUTPATIENT
Start: 2025-01-01 | End: 2025-01-01 | Stop reason: HOSPADM

## 2025-01-01 RX ORDER — SODIUM CHLORIDE 0.9 % (FLUSH) 0.9 %
5-40 SYRINGE (ML) INJECTION EVERY 12 HOURS SCHEDULED
Status: DISCONTINUED | OUTPATIENT
Start: 2025-01-01 | End: 2025-01-01 | Stop reason: HOSPADM

## 2025-01-01 RX ORDER — SODIUM CHLORIDE 0.9 % (FLUSH) 0.9 %
5-40 SYRINGE (ML) INJECTION PRN
Status: DISCONTINUED | OUTPATIENT
Start: 2025-01-01 | End: 2025-01-01 | Stop reason: HOSPADM

## 2025-01-01 RX ORDER — PROPOFOL 10 MG/ML
INJECTION, EMULSION INTRAVENOUS
Status: DISCONTINUED | OUTPATIENT
Start: 2025-01-01 | End: 2025-01-01 | Stop reason: SDUPTHER

## 2025-01-01 RX ORDER — SENNA AND DOCUSATE SODIUM 50; 8.6 MG/1; MG/1
1 TABLET, FILM COATED ORAL DAILY
Status: DISCONTINUED | OUTPATIENT
Start: 2025-03-07 | End: 2025-01-01 | Stop reason: HOSPADM

## 2025-01-01 RX ORDER — DOPAMINE HYDROCHLORIDE 320 MG/100ML
INJECTION, SOLUTION INTRAVENOUS
Status: DISCONTINUED | OUTPATIENT
Start: 2025-01-01 | End: 2025-01-01 | Stop reason: SDUPTHER

## 2025-01-01 RX ORDER — FENTANYL CITRATE 50 UG/ML
50 INJECTION, SOLUTION INTRAMUSCULAR; INTRAVENOUS ONCE
Status: COMPLETED | OUTPATIENT
Start: 2025-01-01 | End: 2025-01-01

## 2025-01-01 RX ORDER — ATORVASTATIN CALCIUM 40 MG/1
40 TABLET, FILM COATED ORAL NIGHTLY
Status: DISCONTINUED | OUTPATIENT
Start: 2025-01-01 | End: 2025-01-01 | Stop reason: HOSPADM

## 2025-01-01 RX ORDER — GLYCOPYRROLATE 0.2 MG/ML
0.2 INJECTION INTRAMUSCULAR; INTRAVENOUS EVERY 4 HOURS PRN
Status: DISCONTINUED | OUTPATIENT
Start: 2025-01-01 | End: 2025-01-01 | Stop reason: HOSPADM

## 2025-01-01 RX ORDER — POLYETHYLENE GLYCOL 3350 17 G/17G
17 POWDER, FOR SOLUTION ORAL DAILY
Status: DISCONTINUED | OUTPATIENT
Start: 2025-03-07 | End: 2025-01-01 | Stop reason: HOSPADM

## 2025-01-01 RX ORDER — SODIUM CHLORIDE 9 MG/ML
INJECTION, SOLUTION INTRAVENOUS PRN
Status: DISCONTINUED | OUTPATIENT
Start: 2025-01-01 | End: 2025-01-01 | Stop reason: HOSPADM

## 2025-01-01 RX ORDER — ATORVASTATIN CALCIUM 40 MG/1
80 TABLET, FILM COATED ORAL NIGHTLY
Status: DISCONTINUED | OUTPATIENT
Start: 2025-01-01 | End: 2025-01-01 | Stop reason: HOSPADM

## 2025-01-01 RX ORDER — FENTANYL CITRATE 50 UG/ML
25 INJECTION, SOLUTION INTRAMUSCULAR; INTRAVENOUS
Status: DISCONTINUED | OUTPATIENT
Start: 2025-01-01 | End: 2025-01-01

## 2025-01-01 RX ORDER — SENNA AND DOCUSATE SODIUM 50; 8.6 MG/1; MG/1
1 TABLET, FILM COATED ORAL DAILY
Status: DISCONTINUED | OUTPATIENT
Start: 2025-01-01 | End: 2025-01-01

## 2025-01-01 RX ORDER — ATROPINE SULFATE 0.4 MG/ML
INJECTION, SOLUTION INTRAMUSCULAR; INTRAVENOUS; SUBCUTANEOUS
Status: DISCONTINUED | OUTPATIENT
Start: 2025-01-01 | End: 2025-01-01 | Stop reason: SDUPTHER

## 2025-01-01 RX ORDER — ONDANSETRON 2 MG/ML
INJECTION INTRAMUSCULAR; INTRAVENOUS
Status: DISCONTINUED | OUTPATIENT
Start: 2025-01-01 | End: 2025-01-01 | Stop reason: SDUPTHER

## 2025-01-01 RX ORDER — ENOXAPARIN SODIUM 100 MG/ML
40 INJECTION SUBCUTANEOUS DAILY
Status: DISCONTINUED | OUTPATIENT
Start: 2025-01-01 | End: 2025-01-01 | Stop reason: HOSPADM

## 2025-01-01 RX ORDER — AMIODARONE HYDROCHLORIDE 150 MG/3ML
INJECTION, SOLUTION INTRAVENOUS
Status: DISCONTINUED
Start: 2025-01-01 | End: 2025-01-01 | Stop reason: WASHOUT

## 2025-01-01 RX ORDER — SODIUM CHLORIDE, SODIUM LACTATE, POTASSIUM CHLORIDE, CALCIUM CHLORIDE 600; 310; 30; 20 MG/100ML; MG/100ML; MG/100ML; MG/100ML
INJECTION, SOLUTION INTRAVENOUS CONTINUOUS
Status: DISCONTINUED | OUTPATIENT
Start: 2025-01-01 | End: 2025-01-01

## 2025-01-01 RX ORDER — ATROPINE SULFATE 0.1 MG/ML
1 INJECTION INTRAVENOUS ONCE
Status: DISCONTINUED | OUTPATIENT
Start: 2025-01-01 | End: 2025-01-01

## 2025-01-01 RX ORDER — POLYETHYLENE GLYCOL 3350 17 G/17G
17 POWDER, FOR SOLUTION ORAL DAILY
Status: DISCONTINUED | OUTPATIENT
Start: 2025-01-01 | End: 2025-01-01

## 2025-01-01 RX ORDER — DEXMEDETOMIDINE HYDROCHLORIDE 4 UG/ML
.1-1.5 INJECTION, SOLUTION INTRAVENOUS CONTINUOUS
Status: DISCONTINUED | OUTPATIENT
Start: 2025-01-01 | End: 2025-01-01

## 2025-01-01 RX ORDER — ACETAMINOPHEN 325 MG/1
650 TABLET ORAL EVERY 6 HOURS PRN
Status: DISCONTINUED | OUTPATIENT
Start: 2025-01-01 | End: 2025-01-01

## 2025-01-01 RX ORDER — CHLORHEXIDINE GLUCONATE ORAL RINSE 1.2 MG/ML
15 SOLUTION DENTAL 2 TIMES DAILY
Status: DISCONTINUED | OUTPATIENT
Start: 2025-01-01 | End: 2025-01-01

## 2025-01-01 RX ORDER — DOPAMINE HYDROCHLORIDE 160 MG/100ML
1-20 INJECTION, SOLUTION INTRAVENOUS CONTINUOUS
Status: DISCONTINUED | OUTPATIENT
Start: 2025-01-01 | End: 2025-01-01

## 2025-01-01 RX ORDER — MORPHINE SULFATE 4 MG/ML
4 INJECTION, SOLUTION INTRAMUSCULAR; INTRAVENOUS
Status: DISCONTINUED | OUTPATIENT
Start: 2025-01-01 | End: 2025-01-01 | Stop reason: HOSPADM

## 2025-01-01 RX ORDER — FENTANYL CITRATE 50 UG/ML
50 INJECTION, SOLUTION INTRAMUSCULAR; INTRAVENOUS EVERY 4 HOURS PRN
Status: DISCONTINUED | OUTPATIENT
Start: 2025-01-01 | End: 2025-01-01 | Stop reason: HOSPADM

## 2025-01-01 RX ORDER — DEXAMETHASONE SODIUM PHOSPHATE 4 MG/ML
INJECTION, SOLUTION INTRA-ARTICULAR; INTRALESIONAL; INTRAMUSCULAR; INTRAVENOUS; SOFT TISSUE
Status: DISCONTINUED | OUTPATIENT
Start: 2025-01-01 | End: 2025-01-01 | Stop reason: SDUPTHER

## 2025-01-01 RX ORDER — LORAZEPAM 2 MG/ML
4 INJECTION INTRAMUSCULAR PRN
Status: DISCONTINUED | OUTPATIENT
Start: 2025-01-01 | End: 2025-01-01 | Stop reason: HOSPADM

## 2025-01-01 RX ORDER — ROCURONIUM BROMIDE 10 MG/ML
INJECTION, SOLUTION INTRAVENOUS
Status: DISCONTINUED | OUTPATIENT
Start: 2025-01-01 | End: 2025-01-01 | Stop reason: SDUPTHER

## 2025-01-01 RX ORDER — ROCURONIUM BROMIDE 50 MG/5 ML
30 SYRINGE (ML) INTRAVENOUS ONCE
Status: COMPLETED | OUTPATIENT
Start: 2025-01-01 | End: 2025-01-01

## 2025-01-01 RX ORDER — ASPIRIN 300 MG/1
300 SUPPOSITORY RECTAL DAILY
Status: DISCONTINUED | OUTPATIENT
Start: 2025-01-01 | End: 2025-01-01 | Stop reason: HOSPADM

## 2025-01-01 RX ORDER — POLYETHYLENE GLYCOL 3350 17 G/17G
17 POWDER, FOR SOLUTION ORAL DAILY PRN
Status: DISCONTINUED | OUTPATIENT
Start: 2025-01-01 | End: 2025-01-01 | Stop reason: HOSPADM

## 2025-01-01 RX ORDER — FENTANYL CITRATE-0.9 % NACL/PF 10 MCG/ML
25-200 PLASTIC BAG, INJECTION (ML) INTRAVENOUS CONTINUOUS
Status: DISCONTINUED | OUTPATIENT
Start: 2025-01-01 | End: 2025-01-01 | Stop reason: HOSPADM

## 2025-01-01 RX ORDER — DEXTROSE MONOHYDRATE, SODIUM CHLORIDE, AND POTASSIUM CHLORIDE 50; 2.98; 4.5 G/1000ML; G/1000ML; G/1000ML
INJECTION, SOLUTION INTRAVENOUS CONTINUOUS
Status: DISCONTINUED | OUTPATIENT
Start: 2025-01-01 | End: 2025-01-01 | Stop reason: HOSPADM

## 2025-01-01 RX ORDER — 0.9 % SODIUM CHLORIDE 0.9 %
500 INTRAVENOUS SOLUTION INTRAVENOUS ONCE
Status: COMPLETED | OUTPATIENT
Start: 2025-01-01 | End: 2025-01-01

## 2025-01-01 RX ORDER — VANCOMYCIN 1.75 G/350ML
25 INJECTION, SOLUTION INTRAVENOUS
Status: DISCONTINUED | OUTPATIENT
Start: 2025-01-01 | End: 2025-01-01

## 2025-01-01 RX ORDER — HEPARIN SODIUM 10000 [USP'U]/100ML
5-30 INJECTION, SOLUTION INTRAVENOUS CONTINUOUS
Status: DISCONTINUED | OUTPATIENT
Start: 2025-01-01 | End: 2025-01-01

## 2025-01-01 RX ORDER — PROPOFOL 10 MG/ML
5-50 INJECTION, EMULSION INTRAVENOUS CONTINUOUS
Status: DISCONTINUED | OUTPATIENT
Start: 2025-01-01 | End: 2025-01-01

## 2025-01-01 RX ORDER — LORAZEPAM 2 MG/ML
4 INJECTION INTRAMUSCULAR
Status: DISCONTINUED | OUTPATIENT
Start: 2025-01-01 | End: 2025-01-01 | Stop reason: HOSPADM

## 2025-01-01 RX ORDER — BISACODYL 10 MG
10 SUPPOSITORY, RECTAL RECTAL DAILY PRN
Status: DISCONTINUED | OUTPATIENT
Start: 2025-01-01 | End: 2025-01-01 | Stop reason: HOSPADM

## 2025-01-01 RX ORDER — ATORVASTATIN CALCIUM 40 MG/1
80 TABLET, FILM COATED ORAL NIGHTLY
Status: DISCONTINUED | OUTPATIENT
Start: 2025-01-01 | End: 2025-01-01

## 2025-01-01 RX ORDER — MORPHINE SULFATE 4 MG/ML
4 INJECTION, SOLUTION INTRAMUSCULAR; INTRAVENOUS ONCE
Status: COMPLETED | OUTPATIENT
Start: 2025-01-01 | End: 2025-01-01

## 2025-01-01 RX ORDER — HYDROCORTISONE SODIUM SUCCINATE 100 MG/2ML
150 INJECTION INTRAMUSCULAR; INTRAVENOUS ONCE
Status: COMPLETED | OUTPATIENT
Start: 2025-01-01 | End: 2025-01-01

## 2025-01-01 RX ORDER — EPINEPHRINE 0.1 MG/ML
INJECTION INTRAVENOUS
Status: DISCONTINUED
Start: 2025-01-01 | End: 2025-01-01 | Stop reason: WASHOUT

## 2025-01-01 RX ORDER — ASPIRIN 81 MG/1
81 TABLET, CHEWABLE ORAL DAILY
Status: DISCONTINUED | OUTPATIENT
Start: 2025-01-01 | End: 2025-01-01 | Stop reason: HOSPADM

## 2025-01-01 RX ORDER — SODIUM CHLORIDE, SODIUM LACTATE, POTASSIUM CHLORIDE, AND CALCIUM CHLORIDE .6; .31; .03; .02 G/100ML; G/100ML; G/100ML; G/100ML
500 INJECTION, SOLUTION INTRAVENOUS ONCE
Status: COMPLETED | OUTPATIENT
Start: 2025-01-01 | End: 2025-01-01

## 2025-01-01 RX ORDER — INSULIN LISPRO 100 [IU]/ML
0-8 INJECTION, SOLUTION INTRAVENOUS; SUBCUTANEOUS EVERY 6 HOURS SCHEDULED
Status: DISCONTINUED | OUTPATIENT
Start: 2025-01-01 | End: 2025-01-01 | Stop reason: HOSPADM

## 2025-01-01 RX ORDER — NOREPINEPHRINE BITARTRATE 0.06 MG/ML
1-100 INJECTION, SOLUTION INTRAVENOUS CONTINUOUS
Status: DISCONTINUED | OUTPATIENT
Start: 2025-01-01 | End: 2025-01-01

## 2025-01-01 RX ORDER — LEVETIRACETAM 500 MG/5ML
500 INJECTION, SOLUTION, CONCENTRATE INTRAVENOUS EVERY 12 HOURS
Status: DISCONTINUED | OUTPATIENT
Start: 2025-01-01 | End: 2025-01-01 | Stop reason: HOSPADM

## 2025-01-01 RX ORDER — GLYCOPYRROLATE 0.2 MG/ML
0.2 INJECTION INTRAMUSCULAR; INTRAVENOUS ONCE
Status: DISCONTINUED | OUTPATIENT
Start: 2025-01-01 | End: 2025-01-01 | Stop reason: HOSPADM

## 2025-01-01 RX ORDER — PROPOFOL 10 MG/ML
5-50 INJECTION, EMULSION INTRAVENOUS CONTINUOUS
Status: DISCONTINUED | OUTPATIENT
Start: 2025-01-01 | End: 2025-01-01 | Stop reason: HOSPADM

## 2025-01-01 RX ORDER — NOREPINEPHRINE BITARTRATE 0.06 MG/ML
INJECTION, SOLUTION INTRAVENOUS
Status: COMPLETED
Start: 2025-01-01 | End: 2025-01-01

## 2025-01-01 RX ORDER — IOPAMIDOL 755 MG/ML
100 INJECTION, SOLUTION INTRAVASCULAR
Status: COMPLETED | OUTPATIENT
Start: 2025-01-01 | End: 2025-01-01

## 2025-01-01 RX ORDER — LORAZEPAM 2 MG/ML
4 INJECTION INTRAMUSCULAR ONCE
Status: COMPLETED | OUTPATIENT
Start: 2025-01-01 | End: 2025-01-01

## 2025-01-01 RX ORDER — IOPAMIDOL 612 MG/ML
INJECTION, SOLUTION INTRAVASCULAR PRN
Status: COMPLETED | OUTPATIENT
Start: 2025-01-01 | End: 2025-01-01

## 2025-01-01 RX ORDER — SODIUM CHLORIDE, SODIUM LACTATE, POTASSIUM CHLORIDE, CALCIUM CHLORIDE 600; 310; 30; 20 MG/100ML; MG/100ML; MG/100ML; MG/100ML
INJECTION, SOLUTION INTRAVENOUS
Status: DISCONTINUED | OUTPATIENT
Start: 2025-01-01 | End: 2025-01-01 | Stop reason: SDUPTHER

## 2025-01-01 RX ORDER — ONDANSETRON 4 MG/1
4 TABLET, ORALLY DISINTEGRATING ORAL EVERY 8 HOURS PRN
Status: DISCONTINUED | OUTPATIENT
Start: 2025-01-01 | End: 2025-01-01 | Stop reason: HOSPADM

## 2025-01-01 RX ADMIN — FENTANYL CITRATE 50 MCG: 50 INJECTION INTRAMUSCULAR; INTRAVENOUS at 18:13

## 2025-01-01 RX ADMIN — Medication 30 MG: at 16:30

## 2025-01-01 RX ADMIN — CHLORHEXIDINE GLUCONATE 15 ML: 1.2 RINSE ORAL at 11:06

## 2025-01-01 RX ADMIN — PIPERACILLIN AND TAZOBACTAM 3375 MG: 3; .375 INJECTION, POWDER, LYOPHILIZED, FOR SOLUTION INTRAVENOUS at 09:46

## 2025-01-01 RX ADMIN — ATORVASTATIN CALCIUM 80 MG: 40 TABLET, FILM COATED ORAL at 21:52

## 2025-01-01 RX ADMIN — MORPHINE SULFATE 4 MG: 4 INJECTION INTRAVENOUS at 19:28

## 2025-01-01 RX ADMIN — FENTANYL CITRATE 25 MCG: 50 INJECTION INTRAMUSCULAR; INTRAVENOUS at 18:12

## 2025-01-01 RX ADMIN — EPINEPHRINE 10 MCG/MIN: 1 INJECTION INTRAMUSCULAR; INTRAVENOUS; SUBCUTANEOUS at 20:49

## 2025-01-01 RX ADMIN — FENTANYL CITRATE 50 MCG: 50 INJECTION INTRAMUSCULAR; INTRAVENOUS at 14:11

## 2025-01-01 RX ADMIN — IOPAMIDOL 100 ML: 755 INJECTION, SOLUTION INTRAVENOUS at 20:14

## 2025-01-01 RX ADMIN — PROPOFOL 50 MCG/KG/MIN: 10 INJECTION, EMULSION INTRAVENOUS at 21:19

## 2025-01-01 RX ADMIN — SODIUM CHLORIDE, SODIUM LACTATE, POTASSIUM CHLORIDE, AND CALCIUM CHLORIDE: .6; .31; .03; .02 INJECTION, SOLUTION INTRAVENOUS at 03:07

## 2025-01-01 RX ADMIN — FAMOTIDINE 20 MG: 10 INJECTION, SOLUTION INTRAVENOUS at 11:05

## 2025-01-01 RX ADMIN — PIPERACILLIN AND TAZOBACTAM 3375 MG: 3; .375 INJECTION, POWDER, LYOPHILIZED, FOR SOLUTION INTRAVENOUS at 16:48

## 2025-01-01 RX ADMIN — LEVETIRACETAM 1500 MG: 100 INJECTION INTRAVENOUS at 16:48

## 2025-01-01 RX ADMIN — PIPERACILLIN AND TAZOBACTAM 3375 MG: 3; .375 INJECTION, POWDER, LYOPHILIZED, FOR SOLUTION INTRAVENOUS at 16:32

## 2025-01-01 RX ADMIN — PIPERACILLIN AND TAZOBACTAM 3375 MG: 3; .375 INJECTION, POWDER, LYOPHILIZED, FOR SOLUTION INTRAVENOUS at 00:16

## 2025-01-01 RX ADMIN — DOPAMINE HYDROCHLORIDE IN DEXTROSE 5 MCG/KG/MIN: 3.2 INJECTION, SOLUTION INTRAVENOUS at 22:15

## 2025-01-01 RX ADMIN — DEXAMETHASONE SODIUM PHOSPHATE 4 MG: 4 INJECTION, SOLUTION INTRAMUSCULAR; INTRAVENOUS at 22:34

## 2025-01-01 RX ADMIN — HEPARIN SODIUM 400 ML: 1000 INJECTION INTRAVENOUS; SUBCUTANEOUS at 23:15

## 2025-01-01 RX ADMIN — EPINEPHRINE 1 MCG/MIN: 1 INJECTION INTRAMUSCULAR; INTRAVENOUS; SUBCUTANEOUS at 16:24

## 2025-01-01 RX ADMIN — SODIUM CHLORIDE, SODIUM LACTATE, POTASSIUM CHLORIDE, AND CALCIUM CHLORIDE: .6; .31; .03; .02 INJECTION, SOLUTION INTRAVENOUS at 14:27

## 2025-01-01 RX ADMIN — KETAMINE HYDROCHLORIDE 50 MG: 50 INJECTION INTRAMUSCULAR; INTRAVENOUS at 16:29

## 2025-01-01 RX ADMIN — PROPOFOL 20 MCG/KG/MIN: 10 INJECTION, EMULSION INTRAVENOUS at 16:43

## 2025-01-01 RX ADMIN — SODIUM CHLORIDE, SODIUM LACTATE, POTASSIUM CHLORIDE, AND CALCIUM CHLORIDE: .6; .31; .03; .02 INJECTION, SOLUTION INTRAVENOUS at 11:07

## 2025-01-01 RX ADMIN — ONDANSETRON 4 MG: 2 INJECTION INTRAMUSCULAR; INTRAVENOUS at 22:34

## 2025-01-01 RX ADMIN — FAMOTIDINE 20 MG: 10 INJECTION, SOLUTION INTRAVENOUS at 09:48

## 2025-01-01 RX ADMIN — LIDOCAINE HYDROCHLORIDE 5 ML: 10 INJECTION, SOLUTION EPIDURAL; INFILTRATION; INTRACAUDAL; PERINEURAL at 22:29

## 2025-01-01 RX ADMIN — PROPOFOL 15 MCG/KG/MIN: 10 INJECTION, EMULSION INTRAVENOUS at 23:31

## 2025-01-01 RX ADMIN — SODIUM CHLORIDE, PRESERVATIVE FREE 10 ML: 5 INJECTION INTRAVENOUS at 09:09

## 2025-01-01 RX ADMIN — CHLORHEXIDINE GLUCONATE 15 ML: 1.2 RINSE ORAL at 02:31

## 2025-01-01 RX ADMIN — SODIUM CHLORIDE, PRESERVATIVE FREE 10 ML: 5 INJECTION INTRAVENOUS at 11:06

## 2025-01-01 RX ADMIN — CEFEPIME 2000 MG: 2 INJECTION, POWDER, FOR SOLUTION INTRAVENOUS at 15:36

## 2025-01-01 RX ADMIN — IOPAMIDOL 92 ML: 612 INJECTION, SOLUTION INTRAVENOUS at 23:26

## 2025-01-01 RX ADMIN — SODIUM CHLORIDE, SODIUM LACTATE, POTASSIUM CHLORIDE, AND CALCIUM CHLORIDE 500 ML: .6; .31; .03; .02 INJECTION, SOLUTION INTRAVENOUS at 02:27

## 2025-01-01 RX ADMIN — MORPHINE SULFATE 4 MG: 4 INJECTION INTRAVENOUS at 19:08

## 2025-01-01 RX ADMIN — CHLORHEXIDINE GLUCONATE 15 ML: 1.2 RINSE ORAL at 20:04

## 2025-01-01 RX ADMIN — SODIUM CHLORIDE, SODIUM LACTATE, POTASSIUM CHLORIDE, AND CALCIUM CHLORIDE: .6; .31; .03; .02 INJECTION, SOLUTION INTRAVENOUS at 23:40

## 2025-01-01 RX ADMIN — DIBASIC SODIUM PHOSPHATE, MONOBASIC POTASSIUM PHOSPHATE AND MONOBASIC SODIUM PHOSPHATE 2 TABLET: 852; 155; 130 TABLET ORAL at 14:11

## 2025-01-01 RX ADMIN — CHLORHEXIDINE GLUCONATE 15 ML: 1.2 RINSE ORAL at 00:16

## 2025-01-01 RX ADMIN — FENTANYL CITRATE 50 MCG: 50 INJECTION INTRAMUSCULAR; INTRAVENOUS at 18:40

## 2025-01-01 RX ADMIN — PROPOFOL 100 MG: 10 INJECTION, EMULSION INTRAVENOUS at 23:19

## 2025-01-01 RX ADMIN — DOPAMINE HYDROCHLORIDE 5 MCG/KG/MIN: 320 INJECTION, SOLUTION INTRAVENOUS at 16:46

## 2025-01-01 RX ADMIN — CHLORHEXIDINE GLUCONATE 15 ML: 1.2 RINSE ORAL at 09:47

## 2025-01-01 RX ADMIN — NOREPINEPHRINE BITARTRATE 5 MCG/MIN: 64 SOLUTION INTRAVENOUS at 14:17

## 2025-01-01 RX ADMIN — FENTANYL CITRATE 50 MCG: 50 INJECTION INTRAMUSCULAR; INTRAVENOUS at 19:51

## 2025-01-01 RX ADMIN — EPINEPHRINE 2 MCG/MIN: 1 INJECTION INTRAMUSCULAR; INTRAVENOUS; SUBCUTANEOUS at 03:41

## 2025-01-01 RX ADMIN — HEPARIN SODIUM 16 UNITS/KG/HR: 10000 INJECTION, SOLUTION INTRAVENOUS at 16:29

## 2025-01-01 RX ADMIN — SODIUM CHLORIDE, PRESERVATIVE FREE 10 ML: 5 INJECTION INTRAVENOUS at 09:52

## 2025-01-01 RX ADMIN — ROCURONIUM BROMIDE 50 MG: 10 SOLUTION INTRAVENOUS at 22:15

## 2025-01-01 RX ADMIN — FENTANYL CITRATE 50 MCG: 50 INJECTION INTRAMUSCULAR; INTRAVENOUS at 02:15

## 2025-01-01 RX ADMIN — LORAZEPAM 4 MG: 2 INJECTION INTRAMUSCULAR; INTRAVENOUS at 19:07

## 2025-01-01 RX ADMIN — ATROPINE SULFATE 0.5 MG: 0.4 INJECTION, SOLUTION INTRAMUSCULAR; INTRAVENOUS; SUBCUTANEOUS at 22:25

## 2025-01-01 RX ADMIN — PIPERACILLIN AND TAZOBACTAM 3375 MG: 3; .375 INJECTION, POWDER, LYOPHILIZED, FOR SOLUTION INTRAVENOUS at 16:18

## 2025-01-01 RX ADMIN — HYDROCORTISONE SODIUM SUCCINATE 150 MG: 100 INJECTION, POWDER, FOR SOLUTION INTRAMUSCULAR; INTRAVENOUS at 15:35

## 2025-01-01 RX ADMIN — SODIUM CHLORIDE, POTASSIUM CHLORIDE, SODIUM LACTATE AND CALCIUM CHLORIDE: 600; 310; 30; 20 INJECTION, SOLUTION INTRAVENOUS at 22:20

## 2025-01-01 RX ADMIN — HEPARIN SODIUM 12 UNITS/KG/HR: 10000 INJECTION, SOLUTION INTRAVENOUS at 04:39

## 2025-01-01 RX ADMIN — SODIUM CHLORIDE, SODIUM LACTATE, POTASSIUM CHLORIDE, AND CALCIUM CHLORIDE: .6; .31; .03; .02 INJECTION, SOLUTION INTRAVENOUS at 13:15

## 2025-01-01 RX ADMIN — POLYETHYLENE GLYCOL 3350 17 G: 17 POWDER, FOR SOLUTION ORAL at 13:51

## 2025-01-01 RX ADMIN — SODIUM CHLORIDE 1000 ML: 9 INJECTION, SOLUTION INTRAVENOUS at 14:15

## 2025-01-01 RX ADMIN — Medication 25 MCG/HR: at 19:39

## 2025-01-01 RX ADMIN — ACETAMINOPHEN 650 MG: 325 TABLET ORAL at 12:31

## 2025-01-01 RX ADMIN — NOREPINEPHRINE BITARTRATE 5 MCG/MIN: 0.06 INJECTION, SOLUTION INTRAVENOUS at 14:17

## 2025-01-01 RX ADMIN — ATORVASTATIN CALCIUM 80 MG: 40 TABLET, FILM COATED ORAL at 20:46

## 2025-01-01 RX ADMIN — SENNOSIDES AND DOCUSATE SODIUM 1 TABLET: 50; 8.6 TABLET ORAL at 13:51

## 2025-01-01 RX ADMIN — ACETAMINOPHEN 650 MG: 325 TABLET ORAL at 10:01

## 2025-01-01 RX ADMIN — FAMOTIDINE 20 MG: 10 INJECTION, SOLUTION INTRAVENOUS at 02:31

## 2025-01-01 RX ADMIN — LORAZEPAM 4 MG: 2 INJECTION INTRAMUSCULAR; INTRAVENOUS at 19:29

## 2025-01-01 RX ADMIN — SODIUM CHLORIDE 500 ML: 9 INJECTION, SOLUTION INTRAVENOUS at 13:45

## 2025-01-01 RX ADMIN — PIPERACILLIN AND TAZOBACTAM 4500 MG: 4; .5 INJECTION, POWDER, LYOPHILIZED, FOR SOLUTION INTRAVENOUS at 11:32

## 2025-01-01 RX ADMIN — SODIUM CHLORIDE, SODIUM LACTATE, POTASSIUM CHLORIDE, AND CALCIUM CHLORIDE: .6; .31; .03; .02 INJECTION, SOLUTION INTRAVENOUS at 16:25

## 2025-01-01 RX ADMIN — ACETAMINOPHEN 650 MG: 325 TABLET ORAL at 23:58

## 2025-01-01 RX ADMIN — EPINEPHRINE 10 MCG/MIN: 1 INJECTION INTRAMUSCULAR; INTRAVENOUS; SUBCUTANEOUS at 22:15

## 2025-01-01 RX ADMIN — PIPERACILLIN AND TAZOBACTAM 3375 MG: 3; .375 INJECTION, POWDER, LYOPHILIZED, FOR SOLUTION INTRAVENOUS at 00:27

## 2025-01-01 RX ADMIN — FENTANYL CITRATE 50 MCG: 50 INJECTION INTRAMUSCULAR; INTRAVENOUS at 12:44

## 2025-01-01 RX ADMIN — PIPERACILLIN AND TAZOBACTAM 3375 MG: 3; .375 INJECTION, POWDER, LYOPHILIZED, FOR SOLUTION INTRAVENOUS at 10:48

## 2025-01-01 ASSESSMENT — PULMONARY FUNCTION TESTS
PIF_VALUE: 19
PIF_VALUE: 19
PIF_VALUE: 18
PIF_VALUE: 19
PIF_VALUE: 16
PIF_VALUE: 20
PIF_VALUE: 21
PIF_VALUE: 18
PIF_VALUE: 19
PIF_VALUE: 16
PIF_VALUE: 18
PIF_VALUE: 19
PIF_VALUE: 19
PIF_VALUE: 18
PIF_VALUE: 17
PIF_VALUE: 17
PIF_VALUE: 20

## 2025-01-01 ASSESSMENT — PAIN SCALES - GENERAL
PAINLEVEL_OUTOF10: 7
PAINLEVEL_OUTOF10: 0
PAINLEVEL_OUTOF10: 7
PAINLEVEL_OUTOF10: 0

## 2025-03-03 PROBLEM — R41.82 ALTERED MENTAL STATE: Status: ACTIVE | Noted: 2025-01-01

## 2025-03-03 PROBLEM — I63.9 ACUTE CEREBROVASCULAR ACCIDENT (CVA) (HCC): Status: ACTIVE | Noted: 2025-01-01

## 2025-03-03 NOTE — CONSULTS
Cardiology Consultation Note / Admission History & Physical      Patient: Lurdes Brooks Age: 64 y.o. Sex: female    YOB: 1960 Admit Date: 3/3/2025 PCP: No primary care provider on file.   MRN: [id number]  CSN: [id number]       PCP: No primary care provider on file.  Cardiologist:    Recommendations:    We discussed the case with ER physician, ICU physician and myself.  We do not feel patient has acute STEMI especially in setting of normal ejection fraction and concern for intracerebral pathology.  Patient U-Tox is positive for cocaine and cannabinoids.  Now postintubation patient is going to CT scan and MRI for further workup.  Patient's heart rate responded to dopamine drip.  Patient has got official echocardiogram confirming normal left ventricular ejection fraction.  Patient was admitted to ICU.  He will follow along.  If patient urine workup is completely negative, will consider ischemic workup    Impressions:    Acute encephalopathy  High suspicion CVA, and seizure  Cocaine toxicity  Diffuse ST elevation the ECG with preserved left ventricular ejection fraction  Sinus bradycardia with first-degree block      Problems:    There are no problems to display for this patient.      HPI:    64-year-old female with prior stroke, hypertension, glaucoma, polysubstance abuse was found encephalopathic and unresponsive in a motel with illicit substance around her per EMS was brought to [facility].  Patient has diffuse ST elevation on the ECG initially code STEMI was called.  On arrival, patient found to bradycardic and unable to move her right lower extremity.  There was concern of postictal period as well.  Code stroke was called.  Bedside echocardiogram showed left ejection fraction 55 to 60% with no obvious wall motion abnormalities.  The patient received Narcan atropine in the emergency room followed by dopamine drip..  Patient not able to stay still, patient is intubated by her excellent ICU

## 2025-03-03 NOTE — H&P
Name: Lurdes Brooks   : 1960   MRN: [id number]   Date: 3/3/2025      Admission diagnoses:    Altered MS  Polysubstance use - UDS positive for cocaine, THC  Suspected CVA  Suspected seizures  Severe bradycardia (sinus with competing junctional rhythm)   First-degree heart block  ST segment elevation in all EKG leads    HPI:   64 F who was found in her hotel room unresponsive after she failed to check out.  EMS was dispatched and noted drug paraphernalia in the room.  Patient was profoundly bradycardic and was transported to [facility] ED where she was unresponsive, bradycardic with HR in upper 30s, low 40s and hypotensive.  She remained on a nonrebreather mask and norepinephrine was initiated.  EKG revealed ST segment elevation in all leads.  Cardiology service was contacted first and an echocardiogram was performed which demonstrated no segmental wall motion abnormalities and normal-appearing LV function.  Therefore, it was not felt that the EKG changes represented an acute coronary syndrome. Initial noncontrasted CT scan of the head was performed with no acute findings.  CTA and perfusion scans were not performed due to poor cooperation.  [facility] was notified.  She was intubated to get further imaging studies of her head.  Norepinephrine was changed to dopamine and when titrated to 20 mcg/kg/min her heart rate came up into the 80s.      Prior to intubation, she was stuporous and seems to demonstrate right hemineglect with a left gaze preference.  She withdrew her left upper and lower extremities from painful stimuli.  There was no withdrawal in the right upper extremity and minimal withdrawal in the left lower extremity. GCS was 6 or 7    At the time of this dictation, rapid EEG is pending as are CTA and perfusion scans of the brain.     Her personal identity was discovered because she required identification to check into the hotel.  No family has been identified.     Active Problems Being Managed:   Altered

## 2025-03-03 NOTE — ED NOTES
1630- ketamine and rocuronium given per MD Merchant for intubation.     1634- Pt intubated att by MD Merchant at this time 7.5 ETT, 24 at gum. +color change, equal breath sounds per MD.

## 2025-03-03 NOTE — ED NOTES
Patient arrives via EMS from [facility] where she was found unresponsive with drug paraphernalia. Pt withdraws from pain, some spontaneous movement with fixed gaze to the left. Pt arrives on NRB mask, R. EJ line placed by EMS. Cardiology and MD Hutchison at bedside. Code S Level 2 paged per MD Hutchison.

## 2025-03-03 NOTE — ED NOTES
Verbal (telephone) report given to Maya (oncoming nurse) by Viviana (offgoing nurse) for transfer of care to ICU. Report included the following information Nurse Handoff Report, ED Encounter Summary, ED SBAR, Adult Overview, MAR, Recent Results, Neuro Assessment, and Event Log, any outstanding orders to be completed. Opportunity for questions and clarification provided. Maya to transport from ED to ICU.

## 2025-03-03 NOTE — PROGRESS NOTES
1830 - Called to get report but no answer. Will call again.  TRANSFER - IN REPORT:    Verbal report received from ONELIA Michelle on Lurdes Brooks  being received from ED for urgent transfer      Report consisted of patient's Situation, Background, Assessment and   Recommendations(SBAR).     Information from the following report(s) Nurse Handoff Report, Index, Intake/Output, MAR, Recent Results, Med Rec Status, and Cardiac Rhythm Sinus Bradycardia  was reviewed with the receiving nurse.    Opportunity for questions and clarification was provided.      Assessment completed upon patient's arrival to unit and care assumed.     Verbal order received from Dr. Bautista to start fentanyl gtt.       Bedside and Verbal shift change report given to ONELIA Hernandez (oncoming nurse) by ONELIA Trejo (offgoing nurse). Report included the following information Nurse Handoff Report, Index, Intake/Output, MAR, Recent Results, and Med Rec Status.

## 2025-03-03 NOTE — ED PROVIDER NOTES
Types: Marijuana (Weed)       Allergies:  Not on File    CURRENT MEDICATIONS      Discharge Medication List as of 3/3/2025  9:49 PM        CONTINUE these medications which have NOT CHANGED    Details   amLODIPine (NORVASC) 5 MG tablet Take 1 tablet by mouth dailyHistorical Med      diclofenac (VOLTAREN) 75 MG EC tablet Take 1 tablet by mouth 2 times daily as neededHistorical Med      gabapentin (NEURONTIN) 300 MG capsule Take 1 capsule by mouth 3 times daily. Max Daily Amount: 900 mgHistorical Med      hydroCHLOROthiazide (HYDRODIURIL) 25 MG tablet Take 1 tablet by mouth dailyHistorical Med      latanoprost (XALATAN) 0.005 % ophthalmic solution Apply 1 drop to eyeHistorical Med      meclizine (ANTIVERT) 25 MG tablet Take 1 tablet by mouth 3 times daily as neededHistorical Med      naproxen sodium (ANAPROX) 220 MG tablet Take 2 tablets by mouth every 8 hours as neededHistorical Med             SCREENINGS               No data recorded      NIH Stroke Scale  NIH Stroke Scale Assessed: Yes  Interval: Baseline  Level of Consciousness (1a): Not alert, requires repeated stimulation to attend  LOC Questions (1b): Answers neither question correctly  LOC Commands (1c): Performs neither task correctly  Best Gaze (2): (!) Forced deviation  Visual (3): (!) Complete hemianopia  Facial Palsy (4): Normal symmetrical movement  Motor Arm, Left (5a): Drift, but does not hit bed  Motor Arm, Right (5b): No movement  Motor Leg, Left (6a): Drift, but does not hit bed  Motor Leg, Right (6b): No effort against gravity, limb falls  Limb Ataxia (7): (!) Present in two limbs  Sensory (8): (!) Severe to total loss  Best Language (9): Severe aphasia  Dysarthria (10): Severe, unintelligible slurring or mute  Extinction and Inattention (11): (!) Profound aren-inattention or extinction to more than one modality  Total: 29     PHYSICAL EXAM      ED Triage Vitals [03/03/25 1408]   Encounter Vitals Group      BP (!) 118/57      Systolic BP Percentile

## 2025-03-03 NOTE — CONSULTS
Code STEMI  64-year-old woman who was found down with no family around.  Patient was found in a motel.  Called EMS staff there was the drugs.  Patient does have a nonpurposeful movement and thrashing.  She is not moving her right side.  ECG showed diffuse ST elevation.  Patient is not able to communicate, but respond to painful stimuli and moves her left side.  Patient is bradycardic.  Bedside echo showed left ejection fraction is prelow.  Served.  Discussed the ER physician.  She received Narcan and atropine so far.  Stroke team is about to evaluate the patient.  We will reevaluate the patient after her workup is complete.  Will obtain official echocardiogram as well.

## 2025-03-03 NOTE — PROGRESS NOTES
MRI PENDING    Completion of MRI Screening Sheet    Fax to [phone] when completed    Please call [phone]  When this is done    Thank You

## 2025-03-04 PROBLEM — R40.4 UNRESPONSIVE EPISODE: Status: ACTIVE | Noted: 2025-01-01

## 2025-03-04 NOTE — BRIEF OP NOTE
Neuro-Interventional Surgery - Brief procedure note      Patient: Lurdes Brooks MRN: [id number]     YOB: 1960  Age: 64 y.o.  Sex: female      Service: Neuro-Interventional Surgery    Date of Procedure: 3/3/2025    Pre-Procedure Diagnosis: Acute stroke, LVO    Post-Procedure Diagnosis: SAME    Procedure(s): Mechanical thrombectomy: Left MCA    Vessels selected: Left common carotid artery, Left internal carotid artery, and Left Middle cerebral artery    Brief Description of Procedure: Occlusion of left middle cerebral artery identified in the M1 segment.  Suspected dissection involving the cervical left ICA noted.    Mechanical thrombectomy performed with TICI 2B recanalization.  No immediate complications.    Right common femoral arterial puncture site hemostasis achieved by deploying 8 F Angio-Seal closure device without complications.  No hematoma or oozing noted.  Distal R lower extremity pulses remain unchanged post hemostasis.  Sterile dressing applied over the puncture site.     Anesthesia:General    Estimated Blood Loss:  30 ml.    Specimens:  None    Implants:  None    Apparent Intraoperative Complications:  None immediate    Patient Condition:  Stable    Disposition:  Intensive care unit    Attestation:  I performed the procedure.        Signed By: Juve Bryan MD     March 3, 2025     [facility] NEUROINTERVENTIONAL SURGERY

## 2025-03-04 NOTE — PROGRESS NOTES
Neurointerventional Surgery Progress Note    Admit Date: 3/3/2025   LOS: 1 day      Daily Progress Note: 3/4/2025    Assessment:     Left M1 occlusion, suspected cervical left ICA dissection s/p mechanical thrombectomy with Dr. Bryan on 03/03/2025 TICI 2b     Plan:     - -140  - Neuro checks per post EVT protocol  - TTE pending  - LDL 76.4, goal LDL<70. Continue atorvastain 80 mg daily  - A1c 5.2    - Continue heparin gtt,  neuro protocol with NO BOLUS management per primary team/cardiology  - PT/OT/SLP evals pending  - Stroke education  - Neurology following, appreciate recs    HPI: Lurdes Brooks is a 64 y.o female with pmh of glaucoma, HTN, HLD, heavy smoker, and substance abuse who reportedly checked in at a hotel on 03/02/25. She failed to check out the following day 03/03/25 and staff found the patient unresponsive. EMS found drug paraphernalia in patient's room. In ED, HR was in the 30's w/ suspicion for possible MI after EKG. Cardiology consulted. Cardio recommended Dopamine and TTE. TTE obtained showed no signs for ACS. The patient was intubated for airway protection. Reportedly, prior to intubation, she was noted with right-sided weakness associated with left gaze preference and GCS of 6-7. Dropped SBP to 70's thus Dopamine was switched to Epinephrine. Patient tested + for cocaine. CTH showed no acute intracranial abnormalities except for subacute or chronic infarct in the right cerebellum and old lacunar infarcts in the bg. CTP showed perfusion mismatch and L MCA M1 occlusion on CTA h-n. The patient was emergently transferred to [facility] IR for thrombectomy of LMCA after Dr. Bryan with NIS reviewed vessel imaging.      Per patient's son, they recently moved this weekend and patient was living with son prior to the move. After the move, patient checked into a hotel. Patient's son admits patient is heavy smoker, uses illicit drugs with occasional marijuana use. Not on APT/AC or statin. Patient has complete  Time: 03/03/25  6:17 PM   Result Value Ref Range    pH, Arterial 7.42 7.35 - 7.45      pCO2, Arterial 31 (L) 35 - 45 mmHg    pO2, Arterial 473 (H) 80 - 100 mmHg    POC O2  (H) 92 - 97 %    HCO3, Arterial 19 (L) 22 - 26 mmol/L    Base deficit, arterial blood 3.9 mmol/L    O2 Method VENTILATOR      FIO2 Arterial 100 %    Set Rate, POC 15      POC PEEP/CPA 5      Source ARTERIAL      Site LEFT RADIAL      Migel Test YES     EKG 12 Lead    Collection Time: 03/03/25  8:57 PM   Result Value Ref Range    Ventricular Rate 68 BPM    QRS Duration 124 ms    Q-T Interval 520 ms    QTc Calculation (Bazett) 552 ms    R Axis 85 degrees    T Axis 263 degrees    Diagnosis        Critical Test Result: Long QTc  ** Poor data quality, interpretation may be adversely affected  Wide QRS rhythm with occasional premature ventricular complexes  Nonspecific intraventricular conduction delay  Marked ST abnormality, possible lateral subendocardial injury  Abnormal ECG  When compared with ECG of 03-MAR-2025 18:58,  MANUAL COMPARISON REQUIRED, DATA IS UNCONFIRMED     Blood Gas, Arterial    Collection Time: 03/04/25 12:44 AM   Result Value Ref Range    pH, Arterial 7.29 (L) 7.35 - 7.45      pCO2, Arterial 27 (L) 35 - 45 mmHg    pO2, Arterial 228 (H) 80 - 100 mmHg    POC O2 SAT 99 (H) 92 - 97 %    HCO3, Arterial 12 (L) 22 - 26 mmol/L    Base deficit, arterial blood 12.4 mmol/L    O2 Method VENT      Source ARTERIAL      Site RIGHT RADIAL      Migel Test YES     CBC    Collection Time: 03/04/25  1:57 AM   Result Value Ref Range    WBC 9.6 3.6 - 11.0 K/uL    RBC 3.23 (L) 3.80 - 5.20 M/uL    Hemoglobin 9.9 (L) 11.5 - 16.0 g/dL    Hematocrit 32.6 (L) 35.0 - 47.0 %    .9 (H) 80.0 - 99.0 FL    MCH 30.7 26.0 - 34.0 PG    MCHC 30.4 30.0 - 36.5 g/dL    RDW 12.6 11.5 - 14.5 %    Platelets 235 150 - 400 K/uL    MPV 9.4 8.9 - 12.9 FL    Nucleated RBCs 0.0 0  WBC    nRBC 0.00 0.00 - 0.01 K/uL   Hemoglobin A1c    Collection Time: 03/04/25   1:57 AM   Result Value Ref Range    Hemoglobin A1C 5.2 4.0 - 5.6 %    Estimated Avg Glucose 103 mg/dL   Lipid Panel    Collection Time: 03/04/25  1:57 AM   Result Value Ref Range    Cholesterol, Total 131 <200 MG/DL    Triglycerides 138 <150 MG/DL    HDL 27 MG/DL    LDL Cholesterol 76.4 0 - 100 MG/DL    VLDL Cholesterol Calculated 27.6 MG/DL    Chol/HDL Ratio 4.9 0.0 - 5.0     Basic Metabolic Panel    Collection Time: 03/04/25  1:57 AM   Result Value Ref Range    Sodium 139 136 - 145 mmol/L    Potassium 3.7 3.5 - 5.1 mmol/L    Chloride 109 (H) 97 - 108 mmol/L    CO2 16 (L) 21 - 32 mmol/L    Anion Gap 14 (H) 2 - 12 mmol/L    Glucose 225 (H) 65 - 100 mg/dL    BUN 17 6 - 20 MG/DL    Creatinine 1.69 (H) 0.55 - 1.02 MG/DL    BUN/Creatinine Ratio 10 (L) 12 - 20      Est, Glom Filt Rate 34 (L) >60 ml/min/1.73m2    Calcium 7.5 (L) 8.5 - 10.1 MG/DL   Brain natriuretic peptide    Collection Time: 03/04/25  1:57 AM   Result Value Ref Range    NT Pro- (H) <125 PG/ML   Troponin    Collection Time: 03/04/25  1:57 AM   Result Value Ref Range    Troponin, High Sensitivity 22,382 (HH) 0 - 51 ng/L   TSH + Free T4 Panel    Collection Time: 03/04/25  1:57 AM   Result Value Ref Range    TSH, 3rd Generation 6.32 (H) 0.36 - 3.74 uIU/mL    T4 Free 1.2 0.8 - 1.5 NG/DL   EKG 12 Lead    Collection Time: 03/04/25  2:29 AM   Result Value Ref Range    Ventricular Rate 50 BPM    Atrial Rate 50 BPM    QRS Duration 80 ms    Q-T Interval 592 ms    QTc Calculation (Bazett) 539 ms    P Axis 87 degrees    R Axis -35 degrees    T Axis -71 degrees    Diagnosis       Sinus bradycardia with AV dissociation and Junctional rhythm with   sinus/atrial capture  Left axis deviation  Inferior infarct , age undetermined  T wave abnormality, consider lateral ischemia  Prolonged QT  ** ACUTE MI **  When compared with ECG of 03-MAR-2025 21:08,  Junctional rhythm has replaced Wide QRS rhythm  Vent. rate has decreased BY  31 BPM     APTT    Collection Time:

## 2025-03-04 NOTE — PROGRESS NOTES
Chart reviewed, patient received sedated and on vent. Per ABCDEF protocol, will work with patient when PEEP is 10.0 or less, FIO2 60% or less, and patient is following basic commands. Will follow patient peripherally.     Recommend nursing to complete with patient, as able and per protocol, in order to promote cardiopulmonary systems, maintain strength, endurance and independence:   -bed in chair position or maximize full reverse Trendelenburg position to facilitate upright activity with foot board and non-skid footwear on 3x/day ~30-60 mins each   -ROM during bathing B UEs and LEs  -positioning to prevent contractures and edema  RASS -1/0/+1 (during SAT) Active ROM  Sitting (bed in chair position)  Standing (reverse Trendelenburg)  ADLs   RASS -3/2 Passive ROM  Sit (bed in chair position)   RASS -5/-4 Passive ROM       Thank you for your assistance.    Sigrid BARRON, OTR/L

## 2025-03-04 NOTE — PROGRESS NOTES
SLP CONTACT NOTE:    Consult received and appreciated, chart reviewed. Patient attempted to be seen for evaluation however remains intubated. ST will defer at this time and follow up as medically appropriate.    Thank you  Rani Kelley M.Ed, CCC-SLP  Speech Language Pathologist

## 2025-03-04 NOTE — PROGRESS NOTES
Critical care update    Patient with mild ST elevations, inferior leads.  Troponin jump to 22,382  Noted cardiology note from [facility].   Called on call cardiology here and spoke with Dr. Maikel Anaya.  Discussed case, high risk, as patient is also an acute CVA post thrombectomy patient.   Risk of hemorrhagic conversion.   However patient now also NSTEMI. Cardiology states that patient will need a cath this admission.  May evaluate to take to cath lab in the am. Keep  NPO.   Recommends heparin drip at this time, low dose.   Discussed with neurology/NIS and agree with starting high risk per neuro protocol heparin drip.   Will call patient's son and discuss changes, benefits and risks before preceding with heparin drip.     0345  Notified by nursing and neurology that patient is now having pupillary changes. Will defer further imaging to NIS.   Patient remains bradycardiac rate 50's. Now B/P is systolic in the 80's. Restart Epi drip.   Will place arterial line for hemodynamic monitoring.     Messi Cam, NP-C    Critical Care Medicine  [facility]  '

## 2025-03-04 NOTE — CONSULTS
[facility] CARDIOLOGY  Cardiology Care Note                  []Initial visit     []Established visit     Patient Name: Lurdes Brooks - :1960 - MRN:[id number]  Primary Cardiologist: None  Consulting Cardiologist: Jade Krueger MD        Assessment and Plan       1.  Acute left MCA infarct status post embolectomy with partial recanalization  2.  Non-STEMI versus possible acute inferior infarction versus neurological etiology of ST elevation.  3.  Cocaine use  4.  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  Low normal blood pressure requiring some pressor support uncertain if this is from vasoplegia from sedatives versus some degree of RV dysfunction    Since her presentation has been more than 18 hours.  She has ST elevation in inferior leads.  Vasopressor support has been only minimal and probably secondary to sedatives.  I personally reviewed her echocardiogram which showed minimal reduction in RV function and normal LV function.  Additionally her neurological outcome/prognosis appears poor.  I had a discussion regarding this with Dr. Bartlett as well.    Given overall multiple comorbidities, and marginal benefit of urgent catheterization at this time, with manage conservatively.  Reasonable to continue heparin as recommended by interventional neuro radiology.  Antiplatelets per primary team.  If patient has reasonable neurological reserve recovery will consider cardiac catheterization prior to discharge.    Patient is critically ill with high chance of decompensation.         Reason for initial visit: STEMI    HPI:   65 yo F h/o HTN, HLD, smoking, substance abuse found down in a hotel on 3/3. Drug paraphernalia onsite. In ED, cristian to 30s with concern for MI, now on heparin. CT neuroimaging showed L M1 occlusion . UDS positive for cocaine. 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    Initially evaluated with ST elevations on EKG and inferior leads.  R, NP-C, Last Rate: 3 mL/hr at 03/04/25 1624, 1 mcg/min at 03/04/25 1624    Jade Krueger MD    [facility]  Call center: (P) [phone]  (F) [phone]

## 2025-03-04 NOTE — CONSULTS
Comprehensive Nutrition Assessment    Type and Reason for Visit: Initial, Consult    Nutrition Recommendations/Plan:   Start EN support and slowly advance to goal: Jevity 1.5 @ 45 ml/hr with 130 ml water flush q 4 hr (reduce flush to 50 ml q 4 hr until off iVF).  Check Phosphorus with AM labs  Add bowel regimen       Malnutrition Assessment:  Malnutrition Status:  Insufficient data (03/04/25 1516)    Context:  Acute Illness          Nutrition Assessment:    Pt admitted with Acute CVA. PMHx: HTN, HLD, CVA, Blindness. Acute respiratory failure-intubated for airway protection @ OSH. Acute L MCA d/t M1 occlusion-transferred to [facility] for emergent mechanical thrombectomy. Polysubstance abuse-UDS +cocaine. NSTEMI; cardiogenic shock resolved-off Epi this afternoon. Neurology consulted; MRI brain pending.     Son at bedside-reports pt has always been small; does not think patient has lost weight recently. Pt weighed 104# 9/14/2024 @ [facility] (found via [email or web address]). Suspect poor nutritional status d/t history of substance abuse-appears thin. Informed son of plan to start EN support today. BG elevated d/t stress; no history of DM-A1c WNL.Correction scale insulin ordered.    Above recommended feeding will provide 990 ml, 1485 calories, 63 gm protein and 1530 ml free water (tube feeding/flush) per day to meet estimated needs. Reduce water flush to 50 ml q 4 hr until off IVF.      Nutritionally Significant Medications:  Lipitor, Pepcid, Humalog, LR @ 100 ml/hr      Estimated Daily Nutrient Needs:  Energy Requirements Based On: Kcal/kg  Weight Used for Energy Requirements: Current  Energy (kcal/day): 7259-6435 (25-30 kcal/kg)  Weight Used for Protein Requirements: Current  Protein (g/day): 66-76 (1.3-1.5g/kg)  Method Used for Fluid Requirements: 1 ml/kcal  Fluid (ml/day): 1525    Nutrition Related Findings:   Edema:  None                     Last BM:  PTA    Wounds:   Wound Type: None      Current Nutrition Therapies:  Diet:  NPO  Supplements: NPO  Meal Intake:   No data found.  Supplement Intake:  No data found.  Nutrition Support: n/a      Anthropometric Measures:  Height: 165.1 cm (5' 5\")  Ideal Body Weight (IBW): 125 lbs (57 kg)       Current Body Weight: 50.8 kg (111 lb 15.9 oz), 89.6 % IBW. Weight Source: Bed scale  Current BMI (kg/m2): 18.6                          BMI Categories: Normal Weight (BMI 18.5-24.9)    Wt Readings from Last 10 Encounters:   03/04/25 50.8 kg (112 lb)         Nutrition Diagnosis:   Inadequate oral intake related to cognitive or neurological impairment, impaired respiratory function as evidenced by NPO or clear liquid status due to medical condition, intubation    related to   as evidenced by      Nutrition Interventions:   Food and/or Nutrient Delivery: Start Tube Feeding  Nutrition Education/Counseling: No recommendation at this time  Coordination of Nutrition Care: Continue to monitor while inpatient, Interdisciplinary Rounds       Goals:     Goals: Tolerate nutrition support at goal rate, by next RD assessment       Nutrition Monitoring and Evaluation:   Behavioral-Environmental Outcomes: None Identified  Food/Nutrient Intake Outcomes: Enteral Nutrition Intake/Tolerance  Physical Signs/Symptoms Outcomes: Biochemical Data, Weight, GI Status    Discharge Planning:    Too soon to determine     Karmen Bolaños RD CNSC  Available via [employer]

## 2025-03-04 NOTE — PROGRESS NOTES
Renal Dosing/Monitoring  Medication: Famotidine   Current regimen:  20 mg IV every 12 hr  Recent Labs     03/03/25  1412 03/03/25  1720 03/04/25  0157   CREATININE 1.2 1.56* 1.69*   BUN  --  15 17     Estimated CrCl:  ~25-30 ml/min  Plan: Change to 20 mg IV daily per [facility] P&T Committee Protocol with respect to renal function.  Pharmacy will continue to monitor patient daily and will make dosage adjustments based upon changing renal function.

## 2025-03-04 NOTE — PROGRESS NOTES
I received a call from the transfer center at 2029 notifying me that CT results showed Ms Lurdes Brooks had a L MCA occlusion and that Dr Bryan from [facility] had been notified.  I spoke with Dr Bryan who recommended emergent transfer to [facility].  He also recommended to call teleneurology.  I asked charge nurse to call teleneurology at [phone].      Evette Rose NP

## 2025-03-04 NOTE — PROGRESS NOTES
Physical Therapy  03/04/2025    Received orders for PT evaluation.  Performed chart review.  Patient is currently intubated and sedated.  Not medically appropriate for PT intervention at this time.  Will hold PT evaluation until patient is more medically appropriate and available for PT.    Thank you,  Jennifer Hunt, PT, DPT

## 2025-03-04 NOTE — H&P
Name: Lurdes Brooks   : 1960   MRN: [id number]   Date: 3/4/2025      REASON FOR ICU ADMISSION: Acute CVA     PRINCIPLE ICU DIAGNOSIS   Acute CVA: L MCA, M1 occlusion s/p mechanical thrombectomy with TICI 2B recanalization.   NSTEMI   Symptomatic bradycardia  Shock, likely cardiogenic   Toxic metabolic encephalopathy  VERENA  Cocaine use disorder    PATIENT SUMMARY   Lurdes Brooks is a 64 y.o. female with a PMH of HTN, glaucoma, previous stroke (unknown deficits/location) who was found in her hotel room unresponsive after she failed to check out on 3/3/2025. EMS found drug paraphernalia in the room. She presented to the ED at [facility] via EMS at about 1400. She was profoundly bradycardic to the 30s in the ED and unresponsive.   She was initially maintaining airway on 100% non re- breather. Initial EKG revealed ST segment elevation in all leads. Cardiology service was contacted first and an echocardiogram was performed which demonstrated no segmental wall motion abnormalities and normal-appearing LV function. Therefore, it was not felt that the EKG changes represented an acute coronary syndrome. Initial noncontrasted CT scan of the head was performed with no acute findings. UDS reported positive for cocaine.   Patient decompensated and she was intubated to get further imaging studies of her head.  Norepinephrine was changed to dopamine and when titrated to 20 mcg/kg/min her heart rate came up into the 80s. Prior to intubation, she was stuporous and seems to demonstrate right hemineglect with a left gaze preference. She withdrew her left upper and lower extremities from painful stimuli. There was no withdrawal in the right upper extremity and minimal withdrawal in the left lower extremity. GCS was 6 or 7. The patient again demonstrated hypotension despite being on dopamine of 20.  Dopamine was changed to epinephrine with improved HR and BP response. Cardiology was contacted by [facility] and agreement with this plan. CT imaging

## 2025-03-04 NOTE — PROGRESS NOTES
0000: Patient arrived from Angio on Propofol 35mcg/kg. Epi had been turned off en route by CRNA. Dopamine had previously been discontinued by CRNA. CRNA reversed patient.     0030: Patient briskly withdrawing in LUE & LLE. Weak withdrawal in RLE. No movement in RUE. R NPI 0.7 and L NPI 2.6 with R>L pupil size. Midline gaze. SUDHEER Conner at bedside. Son stated the patient is legally blind in one eye but he can't remember which. Patient is hypothermic; Constanza hugger placed. HR remains 49-60 but SBP 120s. Per SUDHEER Conner SBP goal 100-140. Will keep Epi off for now per SUDHEER Cam.     0100: Patient purposeful in LUE, reaching for ETT. Lifting LLE off the bed. Weak withdrawal in RUE. Unable to follow commands. Slight eye opening to voice.     0200: R NPI improving to 1.5. Pupil size almost equal. SBP slightly downtrending. SUDHEER Cam ordering LR bolus.     0245: EKG read: Acute MI: Sinus bradycardia with AV disassociation and junctional rhythm with sinus/atrial capture, left axis deviation, inferior infarct, possible acute, T wave abnormality consider lateral ischemic, prolonged OT. EKG sent to SUDHEER Cam via [employer].     0300: No changes to neuro exam.     0315: Critical troponins 22,382. Notified SUDHEER Cam. NP reaching out to Cards.     0330: LR bolus finished. BP 86/31. Patient now with forced left gaze, R NPI 0, and L NPI 0. No longer withdrawing in RUE. Notified Dalila & SUDHEER Cam    0333: Repeated pupillometer with R NPI 1.6 and left NPI 0. SUDHEER Conner at bedside.     0340: Per SUDHEER Cam restart low dose Epi.     0345: SUDHEER Shepherd spoke with MD Héctor. Plan for Neuro (low dose) Heparin gtt. SUDHEER Conner in agreement.     0400:  on Epi gtt @ 2mcg. Midline gaze, no deviation. R NPI 0.6, left NPI 2.1. SUDHEER Conner updated. Concern patient is pressure dependent. Keep -140. Marginal UO - SUDHEER Cam notified.     0415: Son & sister notified of updates and new plan of care.     0430: SUDHEER Cam  at bedside to place arterial line.     0600: Intermittent forced L gaze deviation noted. L NPI back to 0, R NPI 0.6. . SUDHEER Conner notified and stated this patient may continue to have waxing/waning neuro exams.     0700: Both R/L NPI back to 0.

## 2025-03-04 NOTE — PROCEDURES
PROCEDURE NOTE  Date: 3/4/2025   Name: Lurdes Brooks  YOB: 1960    Insert Arterial Line    Date/Time: 3/4/2025 5:15 AM    Performed by: Messi Cam NP-C  Authorized by: Messi Cam NP-C  Consent: The procedure was performed in an emergent situation. Written consent not obtained.  Imaging studies: imaging studies available  Required items: required blood products, implants, devices, and special equipment available  Patient identity confirmed: arm band, hospital-assigned identification number and anonymous protocol, patient vented/unresponsive  Time out: Immediately prior to procedure a \"time out\" was called to verify the correct patient, procedure, equipment, support staff and site/side marked as required.  Preparation: Patient was prepped and draped in the usual sterile fashion.  Indications: hemodynamic monitoring  Location: left radial  Anesthesia: local infiltration    Anesthesia:  Local Anesthetic: lidocaine 1% without epinephrine  Anesthetic total: 2 mL    Sedation:  Patient sedated: no    Migel's test normal: yes  Needle gauge: 20  Seldinger technique: Seldinger technique used  Number of attempts: 1  Post-procedure: line sutured  Post-procedure CMS: unchanged and normal  Patient tolerance: patient tolerated the procedure well with no immediate complications  Comments: US guidance was used.      MARGARITA Thompson    Critical Care Medicine  [facility]

## 2025-03-04 NOTE — ANESTHESIA PRE PROCEDURE
EKG revealed ST segment elevation in all leads.  Cardiology service was contacted first and an echocardiogram was performed which demonstrated no segmental wall motion abnormalities and normal-appearing LV function.  Therefore, it was not felt that the EKG changes represented an acute coronary syndrome. Initial noncontrasted CT scan of the head was performed with no acute findings.  CTA and perfusion scans were not performed due to poor cooperation.  [facility] was notified.  She was intubated to get further imaging studies of her head.  Norepinephrine was changed to dopamine and when titrated to 20 mcg/kg/min her heart rate came up into the 80s.       Prior to intubation, she was stuporous and seems to demonstrate right hemineglect with a left gaze preference.  She withdrew her left upper and lower extremities from painful stimuli.  There was no withdrawal in the right upper extremity and minimal withdrawal in the left lower extremity. GCS was 6 or 7     The patient again demonstrated hypotension despite being on dopamine of 20.  Dopamine was changed to epinephrine with improved HR and BP response.  Spoke with cardiology and they were in agreement with this plan.      CTA showed L MCA M1 occlusion.       Neuro/Psych:   Negative Neuro/Psych ROS              GI/Hepatic/Renal: Neg GI/Hepatic/Renal ROS            Endo/Other: Negative Endo/Other ROS                    Abdominal:             Vascular: negative vascular ROS.         Other Findings:             Anesthesia Plan      MAC     ASA 4 - emergent       Induction: intravenous.      Anesthetic plan and risks discussed with Unable to obtain due to emergent nature.      Plan discussed with CRNA.                    Wilbur Goldstein MD   3/3/2025

## 2025-03-04 NOTE — ED NOTES
Bedside shift change report given to Ketty (oncoming nurse) by Viviana (offgoing nurse). Report included the following information Nurse Handoff Report, ED Encounter Summary, ED SBAR, Adult Overview, MAR, Recent Results, Neuro Assessment, and Event Log.

## 2025-03-04 NOTE — ANESTHESIA POSTPROCEDURE EVALUATION
Post-Anesthesia Evaluation and Assessment    Patient: Lurdes Brooks MRN: [id number]  SSN: [id number]    YOB: 1960  Age: 64 y.o.  Sex: female      I have evaluated the patient and they are stable and ready for discharge from the PACU.     Cardiovascular Function/Vital Signs  Visit Vitals  BP (!) 134/100   Pulse 56   Temp 97 °F (36.1 °C) (Axillary)   Resp 14   SpO2 99%       Patient is status post * No anesthesia type entered * anesthesia for * No procedures listed *.    Nausea/Vomiting: None    Postoperative hydration reviewed and adequate.    Pain:  Managed    Neurological Status:   sedated    Mental Status, Level of Consciousness: intubated and sedated    Pulmonary Status:   Adequate oxygenation and airway patent    Complications related to anesthesia: None    Post-anesthesia assessment completed. No concerns    Signed By: Wilbur Goldstein MD     March 4, 2025

## 2025-03-04 NOTE — PROGRESS NOTES
Date/Time Last Known Well Time: unknowm    Date/Time Discovery of Stroke Symptoms: today     NIHSS upon arrival: 29    Time to IR Suite: 2215    Time of Arterial Puncture: 2230    Start of IA Infusion of tPA or  1st pass of recanalization device in   Target vessel: 2240    Time of Revascularization: 2321    Pretreatment TICI: 0    Post treatment TICI: 2b    Procedure Stop Time(When sterile drape is off): 2330    Time of first set of VS, neuro cks, puncture site, and pulse checks: 2330    Time transfer to ICU: 2350    Time of last VS, neuro, puncture site, and pulse checks documentation before ICU caring for pt: 1200    Time of first ICU assessment: 1215    EVD status:NA             TRANSFER - OUT REPORT:    Verbal report given to ICU RN on Lurdes Brooks  being transferred to  for routine progression of patient care       Report consisted of patient's Situation, Background, Assessment and   Recommendations(SBAR).     Information from the following report(s) Nurse Handoff Report and Cardiac Rhythm Sinus Connor 50's  was reviewed with the receiving nurse.           Lines:   CVC  03/03/25 Left Femoral (Active)       Peripheral IV 03/03/25 Right Antecubital (Active)       Peripheral IV 03/03/25 Left Antecubital (Active)        Opportunity for questions and clarification was provided.      Patient transported with:  CRNA, cardiac monitor and BVM.      Dual neuro completed with IR RN and receiving ICU RN upon arrival to unit.

## 2025-03-04 NOTE — OR NURSING
Pt arrives via stretcher to angio department accompanied by CCT for MT procedure. All assessments completed and consent was reviewed.  Education given was regarding procedure, sedation, post-procedure care and  management/follow-up. Opportunity for questions was provided and all questions and concerns were addressed. Neuro assessment and NIH not obtained due to urgency of procedure.

## 2025-03-04 NOTE — PROGRESS NOTES
Interim ICU Consult note:    I was called to the bedside at 2040 for hypotension 70s/50s, HR 40s.  I initiated epinephrine drip in addition to dopamine drip.  BP improved allowing for dopamine drip to be weaned off.  HR currently 60s-70s.   I spoke with Dr Vogel from cardiology. He said to continue the epineprhine.  No indication for pacer at this time.    Evette Rose, SUDHEER

## 2025-03-04 NOTE — CONSULTS
Neurology addendum:  I have personally seen and examined the patient. I have personally reviewed the chart and images. Elements of my examination included history of present illness, review of systems, review of past medical and surgical history, review of medications, and physical and neurological examination. I have personally reviewed the findings and impressions with the nurse practitioner and am in agreement with their note with changes below.    65 yo F h/o HTN, HLD, smoking, substance abuse found down in a hotel on 3/3. Drug paraphernalia onsite. In ED, cristian to 30s with concern for MI, now on heparin. CT neuroimaging showed L M1 occlusion s/p EVT TICI 2B. UDS positive for cocaine. 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 Cardiology following for elevated trops, c/f NSTEMI, with consideration of cath; however, given extent of neurological injury, decision made to defer.     Exam:  Physical Exam:  General: Ill appearing patient in mild distress.   Pulmonary: No increased WOB  Cardiac: Regular rate and rhythm  Extremities: No cyanosis or edema     Neurological Exam: On prop 50, fent 25  Mental Status: Opens eyes to voice, spontaneous movements on the L, no command following   Cranial Nerves:   R pupil unreactive and irregular, L pupil sluggish, L gaze deviation with nystagmus, no diplopia, no ptosis.    Motor:  Spontaneous antigravity semipurposeful movements on the L, flicker to noxious in RUE and RLE   Reflexes:   Toes upgoing on the R   Sensory:   Noxious as above   Gait:     Cerebellar:        Neuroimaging: personally reviewed    CTA HEAD NECK W CONTRAST    Result Date: 3/3/2025  Left middle cerebral artery M1 segment occlusion with 49 cc core infarct and 87 cc ischemic penumbra. Electronically signed by KARRI HARDY    CT BRAIN PERFUSION    Result Date: 3/3/2025  Left middle cerebral artery M1 segment occlusion with 49 cc core infarct and 87 cc ischemic penumbra. Electronically signed by KARRI

## 2025-03-04 NOTE — PROGRESS NOTES
Pre-thrombectomy NIHSS:  64 y.o F transferred from [facility] with left middle cerebral artery M1 segment occlusion with 49 cc core infarct and 87 cc ischemic penumbra. Patient was taken straight to IR for an emergent thrombectomy by Dr. Bryan.     50 mcg of Propofol briefly paused for exams. Patient is blind in one eye and partially blind in the other per son but not sure which one is partial vs complete.  1a  Level of consciousness: 3=responds only with reflex motor or automatic effects or totally unresponsive, flaccid, areflexic   1b. LOC questions:  2=Answers neither question correctly   1c. LOC commands: 2=Performs neither task correctly   2.  Best Gaze: 0=normal   3. Visual: 3=Bilateral hemianopia (blind including cortical blindness)   4. Facial Palsy: 3=Complete paralysis of one or both sides (absence of facial movement in the upper and lower face)   5a. Motor left arm: 3=No effort against gravity, limb falls   5b.  Motor right arm: 4=No movement   6a. Motor left leg: 3=No effort against gravity; leg falls to bed immediately   6b  Motor right leg:  3=No effort against gravity; leg falls to bed immediately   7. Limb Ataxia: 0=Absent   8.  Sensory: 2=Severe to total sensory loss; patient is not aware of being touched in face, arm, leg   9. Best Language:  3=Mute, global aphasia; no usable speech or auditory comprehension   10. Dysarthria: 2=Severe; patient speech is so slurred as to be unintelligible in the absence of or our of proportion to any dysphagia, or is mute/anarthric   11. Extinction and Inattention: 2=Profound aren-inattention or aren-inattention to more than one modality. Does not recognize own hand or orients only to one side of space    Total:    35       Khushi Conner [facility]-NP  Neurovascular Nurse Practitioner  [phone]

## 2025-03-04 NOTE — PROGRESS NOTES
1915 Report given to Mary RN by Maya ROUSSEAU. Patient is intubated, on a dopamine and propofol gtt (see MAR), waiting for transport to CT in room 14 in the ED. No command following, flaccid on right side, left side has non purposeful movements. Pt has left fem CVC, two PIVs in LUE and one in the RUE.     1945 Two Rns at bedside to transfer. PRN Fent given (see MAR) for vent compliance, fent gtt started (see MAR for details).   RT at bedside to transfer patient via stretcher to CT.   Unable to complete full shift assessment at this time.     2023 Transported from CT to CCU via stretcher with two RNS and RT, patient getting bradycardic.   NP notified. BP is decreasing, pads on, levo gtt started. NP at bedside attempting A line insertion. Epi gtt started at 10mcg/min.   Ct results L MCA occlusion, orders to transport to [facility].     Transport at bedside     2115 Report called to Kaylah Rust RN at [facility] ICU     2142 Pt transported via stretcher by transport team.

## 2025-03-04 NOTE — OR NURSING
Pt arrives to IR from ED for mechanical thrombectomy with Dr. Bryan. Verbal report received from CCT. LKW unknown. Consents obtained from pt family. Anesthesia at bedside. [facility] on arrival 29. Pt taken to angio suit to start procedure with Dr. Bryan.

## 2025-03-04 NOTE — PROGRESS NOTES
Post thrombectomy Brief Progress Note:    64 y.o F who is s/p dsa with Left MCA thrombectomy by Dr. Bryan with suspected dissection involving the cervical left ICA noted. TICI 2B reperfusion. No immediate complications. Right groin arteriotomy site is C/D/I without oozing, hematoma, tenderness, redness, bruising. Ext cool to touch and difficulties with palpating distal pulses. Able to capture with doppler but very weak/slow flow.     NIHSS; Propofol off. Right eye blind, Left eye partially blind  1a  Level of consciousness: 2=not alert, requires repeated stimulation to attend, or is obtunded and requires strong or painful stimulation to make movements (not stereotyped)   1b. LOC questions:  2=Answers neither question correctly   1c. LOC commands: 2=Performs neither task correctly   2.  Best Gaze: 0=normal   3. Visual: 3=Bilateral hemianopia (blind including cortical blindness)   4. Facial Palsy: 2=Partial paralysis (total or near total paralysis of the lower face)   5a. Motor left arm: 0=No drift, limb holds 90 (or 45) degrees for full 10 seconds   5b.  Motor right arm: 3=No effort against gravity, limb falls   6a. Motor left leg: 3=No effort against gravity; leg falls to bed immediately   6b  Motor right leg:  3=No effort against gravity; leg falls to bed immediately   7. Limb Ataxia: 0=Absent   8.  Sensory: 1=Mild to moderate sensory loss; patient feels pinprick is less sharp or is dull on the affected side; there is a loss of superficial pain with pinprick but patient is aware She is being touched   9. Best Language:  3=Mute, global aphasia; no usable speech or auditory comprehension   10. Dysarthria: 2=Severe; patient speech is so slurred as to be unintelligible in the absence of or our of proportion to any dysphagia, or is mute/anarthric   11. Extinction and Inattention: 2=Profound aren-inattention or aren-inattention to more than one modality. Does not recognize own hand or orients only to one side of space     Total:    28     Premorbid mRS: 0     ANIKET Luna-NP  Neurovascular Nurse Practitioner  [phone]

## 2025-03-04 NOTE — PROGRESS NOTES
2056: Cardiology paged ([facility]).  2113: TeleNeuro on call with patient/RN.

## 2025-03-04 NOTE — CONSULTS
NeuroInterventional Surgery Consult    Patient: Lurdes Brooks MRN: [id number]  SSN: [id number]    YOB: 1960  Age: 64 y.o.  Sex: female      Chief Complaint: Unresponsiveness     HPI:   64 y.o. -H Black /   female consulted for Occlusion of left middle cerebral artery. The patient is currently unresponsive and unable to provide history therefore obtained most history from son/sister and partially from patient's chart. Lurdes Brooks has pmh of Glaucoma, HTN, HLD, heavy smoker, and substance abuse who reportedly checked in at a hotel on 03/02/25 however, failed to check out the following day 03/03/25. Staff found patient unresponsive after checking on the patient. EMS found drug paraphernalia in patient's room. Upon arrival to the ED, the patient was noted to be cristian in the 30's with the suspicion for possible MI after EGK was obtained. Cardiology was consulted. Recommended Dopamine and TTE obtained showed no signs for ACS. The patient was intubated for airway protection. Reportedly, prior to intubation, she was noted with right-sided weakness associated with left gaze preference and GCS of 6-7. Dropped SBP to 70's thus Dopamine was switched to Epinephrine. Patient tested + for cocaine. CTH showed no acute intracranial abnormalities except for subacute or chronic infarct in the right cerebellum and old lacunar infarcts in the bg. CTP showed perfusion mismatch and L MCA M1 occlusion on CTA h-n. The patient was emergently transferred to [facility] for thrombectomy of LMCA after Dr. Bryan with NIS reviewed vessel imaging.     Per patient's son, they recently moved this past weekend and prior to that, patient was living with him however, after they moved this weekend, the patient didn't move in with him. She checked into a hotel. Patient's son admits patient is heavy smoker and on illicit drugs with occasional marijuana use. Not on APT/AC or statin. Patient has complete blindness in eye and

## 2025-03-04 NOTE — PROCEDURES
[facility]      Procedure Note - Central Venous Access:   Performed by Tramaine Merchant MD    Obtained emergent Consent.    Immediately prior to the procedure, the patient was reevaluated and found suitable for the planned procedure and any planned medications.    Immediately prior to the procedure a time out was called to verify the correct patient, procedure, equipment, staff, and marking as appropriate.    The site was prepped with ChloraPrep.   Using Seldinger technique a Triple Lumen CVC was placed in the Left, Femoral Vein via direct cannulation with 1 number of attempts for IV Access.  Ultrasound Guidance was not utilized.  There was good blood return.  The following complications were encountered: None.  A follow-up chest x-ray was not ordered post procedure.  The procedure was tolerated well.      Tramaine Merchant MD  [facility] 4th floor [facility] phone: [phone]  [facility] 7th floor [facility] phone: [phone]  [facility] phone: [phone]  3/3/2025   
     [facility]      Procedure Note - Intubation:   Performed by Tramaine Merchant MD .     Immediately prior to the procedure, the patient was reevaluated and found suitable for the planned procedure and any planned medications.    Immediately prior to the procedure a time out was called to verify the correct patient, procedure, equipment, staff, and marking as appropriate.    Medications given were ketamine and rocuronium (Zemuron).   A number 7.5 cuffed   ETT was placed to 24 cm at the teeth.   Placement was evaluated by noting bilateral, symmetric breath sounds, good end-tidal CO2 detector color change , and no breath sounds over stomach.    Attempts required: 1.    Complications: none.    RSI was used..  The procedure was tolerated well.      Tramaine Merchant MD  [facility] 4th floor [facility] phone: [phone]  [facility] 7th floor [facility] phone: [phone]  [facility] phone: [phone]  3/3/2025       
placement.    18 channels of scalp EEG are recorded  A channel was used for EoG  Another channel was used for ECG   The data is stored digitally and reviewed in reformatted montages for optimal display  EEG  was reviewed in both bipolar and referential montages    Description of Activity:    The background of this recording revealed a normal background rhythm of 8.5-9 hz in the right hemisphere.  The left hemisphere contained no well-formed alpha activity with a 6-7 hz low amplitude pattern noted.  There was reactivity bilaterally. There was a small amount of beta activity seen in the anterior leads.    Throughout the recording, there were no spike or spike-and-wave discharges seen.     Hyperventilation and photic stimulation were not performed.      During the recording, the patient did not achieve stage II sleep      Clinical Interpretation:    This EEG is abnormal. There was mild asymmetric slowing throughout the left hemisphere suggestive a focal/structural abnormality.  There were no epileptiform discharges or electrographic seizures on this recording.     Clinical correlation is recommended      Xavi Leo D.O. FAAN

## 2025-03-04 NOTE — DISCHARGE SUMMARY
Discharge Summary     Patient: Lurdes Brooks       MRN: [id number]       YOB: 1960       Age: 64 y.o.     Date of admission:  3/3/2025    Date of discharge:  3/3/2025    Primary care provider:  No primary care provider on file.     Admitting provider:  Tramaine Merchant MD    Discharging provider(s): Evette Rose APRN - NP - Tramaine Merchant MD Staff Intensivist - [facility]     Consultations  IP CONSULT TO CARDIOLOGY  IP CONSULT TO TELE-NEUROLOGY  IP CONSULT TO NEUROLOGY  IP CONSULT TO CASE MANAGEMENT    Procedures  Intubation  L femoral CVC    Discharge Condition: Critical condition; transfer to [facility].  Discharge Destination: [facility].  The patient is stable for discharge.    Admission diagnosis  Altered mental state [R41.82]  Acute coronary syndrome (HCC) [I24.9]    Additional Diagnoses:  Symptomatic bradycardia  Shock  L MCA occlusion  Toxic metabolic encephalopathy  VERENA  Cocaine use disorder    Please refer to the admission history and physical for details on the presenting problem.     Lurdes Brooks is a 63 yo female with a PMH of HTN, glaucoma, previous stroke (unknown deficits/location) who was found in her hotel room unresponsive after she failed to check out on 3/3. EMS found drug paraphernalia in the room. She presented to the ED at [facility] via EMS at about 1400. She was profoundly bradycardic to the 30s in the ED and unresponsive.   She was initially maintaining airway on 100% NRB    Initial EKG revealed ST segment elevation in all leads.  Cardiology service was contacted first and an echocardiogram was performed which demonstrated no segmental wall motion abnormalities and normal-appearing LV function.  Therefore, it was not felt that the EKG changes represented an acute coronary syndrome. Initial noncontrasted CT scan of the head was performed with no acute findings.  CTA and perfusion scans were not performed due to poor cooperation.  [facility] was notified.

## 2025-03-04 NOTE — CARE COORDINATION
Care Management Initial Assessment       RUR:13%  Readmission? Yes -   1st IM letter given? No     03/04/25 1411   Service Assessment   Patient Orientation Unable to Assess  (Patient is intubated)   History Provided By Child/Family  (Praneeth Brooks  [phone])   Primary Caregiver Self   Support Systems Children;Family Members   Patient's Healthcare Decision Maker is: Legal Next of Kin  (Praneeth Brooks (only child))   Prior Functional Level Independent in ADLs/IADLs   Current Functional Level Assistance with the following:;Bathing;Dressing;Toileting;Feeding;Mobility   Can patient return to prior living arrangement Unknown at present   Ability to make needs known: Unable   Family able to assist with home care needs: Other (comment)  (Unknown)   Would you like for me to discuss the discharge plan with any other family members/significant others, and if so, who? Yes  (Son)   Financial Resources Medicare  ([employer] Medicare)   Community Resources None   Social/Functional History   Lives With Alone   Type of Home   (Currently staying in a Hotel)   Home Equipment None  (Patient is blind in one eye, poor eyesight in the other)   Receives Help From Family   Prior Level of Assist for ADLs Independent   Prior Level of Assist for Homemaking Independent   Ambulation Assistance Independent   Prior Level of Assist for Transfers Independent   Active  No   Patient's  Info Friends or walks   Mode of Transportation Car   Occupation On disability   Discharge Planning   Living Arrangements Alone   Current Services Prior To Admission None   Potential Assistance Purchasing Medications No   History of falls? 0     Patient presented to the ED at [facility] after being found unresponsive in a hotel. Patient with a fixed gaze, no purposeful movement. CT: Left MCA occulusion. Transferred to [facility] for NIS evaluation. Patient taken to IR for Mechanical thrombectomy. Intubated prior to transfer.   CM met with patient's son Varghese Brooks to  introduce self and explain role. Patient had been living with her son until about a week ago when he moved, he is not sure what her plan was. Patient is blind in one eye with poor vision in the other and will occasionally use it. Patient has no other DME/HH or IPR needs. She goes to [facility] and he is not sure what pharmacy she uses. At discharge he will speak with patient's sister to see where she will be discharged to .   Care Management will follow for transitions of care.   Kaylah Feliciano RN,Care Management  2:20 pm Spoke with Vivien [phone] SUNIL with [facility], she confirmed patient has been assigned to [facility] through [employer] but has not yet been seen. Will inform patient's son.   Kaylah Feliciano RN,Care Management

## 2025-03-05 PROBLEM — Z71.89 GOALS OF CARE, COUNSELING/DISCUSSION: Status: ACTIVE | Noted: 2025-01-01

## 2025-03-05 PROBLEM — Z51.5 PALLIATIVE CARE BY SPECIALIST: Status: ACTIVE | Noted: 2025-01-01

## 2025-03-05 PROBLEM — I21.4 NSTEMI (NON-ST ELEVATED MYOCARDIAL INFARCTION) (HCC): Status: ACTIVE | Noted: 2025-01-01

## 2025-03-05 PROBLEM — I63.9 CEREBROVASCULAR ACCIDENT (CVA) (HCC): Status: ACTIVE | Noted: 2025-01-01

## 2025-03-05 NOTE — PROGRESS NOTES
Neurovascular Brief Progress Note:  64 y.o F transferred from [facility] to [facility] on 03/03/25 with Left MCA occlusion s/p dsa with mechanical thrombectomy by Dr. Bryan with suspected dissection involving the cervical left ICA noted. TICI 2B reperfusion. No immediate complications. Right groin arteriotomy site is C/D/I without oozing, hematoma, tenderness, redness, bruising. Ext cool to touch and weak palpable distal pulses.     Cardiology deferred cath given extent of neurological injury. Pt remained on hep gtt; management per primary team with plan to continue pending serial trop. MRI brain wo pending. Patient's neuro exams continues to wax/wanes. Patient's families at the bedside.     Review of Systems:   Review of systems not obtained due to patient factors. Unresponsive, intubated on ventilator therefore too cognitively or communication impaired to participate in ROS.    Physical Exam:   Blood pressure 108/84, pulse 69, temperature 100.2 °F (37.9 °C), resp. rate 18, height 1.651 m (5' 5\"), weight 50.8 kg (112 lb), SpO2 97%. Net IO Since Admission: 1,660.01 mL [03/04/25 2048]    GEN: Calm, ill-appearing female patient in NAD  HEENT: Normocephalic. Non-icter, no congestion  Lungs: CTA bilaterally Ant; non-labored breathing  Cardiac: S1,S2, normal rate and rhythm, with no murmurs. no gallops  Abdomen: Normal bowel sounds, no distention, soft, non-tender  Extremities: 2+ Radial pulses, no clubbing, cyanosis, or edema  Skin: no rashes or lesions noted        NEURO:  Mental status: Patient opens eyes to pain and voice ?commands following.   Cranial Nerves: WILLIAM Speech/Language with ETT inplace. L gaze preference wax/wanes, sluggish right pupil, unequal pupils L>R, 4.5mm, 3.5mm, sluggish right corneal, no nystagmus, no ptosis. facial weakness with asymmetry. Hearing intact bilaterally.  Motor: Normal bulk and tone, left sided extremities spontaneous movements and will squeeze with left hand; no spontaneous movements to

## 2025-03-05 NOTE — PROGRESS NOTES
Speech Language Pathology Note    Chart reviewed in anticipation of SLP evaluation. Patient remains intubated. SLP to defer at this time, follow-up for evaluation as patient medically able and appropriate.     Thank you,  Sandie Portillo M.Ed, CCC-SLP (pronouns: she/her/hers)  Speech-Language Pathologist

## 2025-03-05 NOTE — PROGRESS NOTES
I was called around 3:45 this AM about this patient after results of MRI showed significant involvement of left hemisphere stroke  She had a fixed , dilated left pupil since nearly the time of admission and been unresponsive since then as well  Multiple medical problems including an active acute MI and substance abuse including recent cocaine use are noted as well. These problems and recent use of IV heparin preclude any surgical intervention but at point I was called , no surgical intervention can reverse the significant neurologic deficits. She is decerebrate on the right side , moves some to pain on the left  does not open eyes  left pupil still large and unreactive  right pupil only a little smaller than left and not reactive to light  extremely poor prognosis  will defer care to the ICU team  Discussed with sister and other family member in the room

## 2025-03-05 NOTE — PROGRESS NOTES
Spiritual Health History and Assessment/Progress Note  [facility]    Spiritual/Emotional Needs,  ,  ,      Name: Lurdes Brooks MRN: [id number]    Age: 64 y.o.     Sex: female   Language: English   Sikhism: Pentecostalism   Acute cerebrovascular accident (CVA) ([facility])     Date: 3/5/2025            Total Time Calculated: 30 min              Spiritual Assessment began in [facility] 7S1 INTENSIVE CARE        Referral/Consult From: Nurse   Encounter Overview/Reason: Spiritual/Emotional Needs  Service Provided For: Family    Kimmy, Belief, Meaning:   Patient unable to assess at this time  Family/Friends have beliefs or practices that help with coping during difficult times      Importance and Influence:  Patient unable to assess at this time  Family/Friends have no beliefs influential to healthcare decision-making identified during this visit    Community:  Patient unable to assess at this time  Family/Friends Other: unable to assess    Assessment and Plan of Care:     Patient unable to assess at this time  Family/Friends Interventions include: Affirmed coping skills/support systems    Patient Plan of Care: Spiritual Care available upon further referral  Family/Friends Plan of Care: Spiritual Care available upon further referral    Paged by ONELIA Clements for emotional distress consult. Family was in the waiting room. I educated on spiritual care and what is provided. They were visibly upset and expressed that they were not in need of spiritual care.   Patient's sister said that \"they just need their sister back\".   I affirmed their feelings and gave them assurances that spiritual care is available upon request.    Electronically signed by Varinder Benton, Chaplain Resident on 3/5/2025 at 3:09 PM

## 2025-03-05 NOTE — CONSULTS
[facility]              [address] [place]  [address]                              CONSULTATION      PATIENT NAME: LYNN JACINTO                   : 1960  MED REC NO: [id number]                       ROOM: 7108  ACCOUNT NO: [id number]                       ADMIT DATE: 2025  PROVIDER: Nnamdi Matias MD    DATE OF SERVICE:  2025    ATTENDING PHYSICIAN:  BECK CODY    CHIEF COMPLAINT:  This is a 64-year-old woman whom I was called about earlier this morning at about 3:45 by the nurse practitioner in the intensive care unit.  She suffered an acute stroke in left middle cerebral artery distribution, but also what appears to be somewhat posterior circulation as well.  She also underwent mechanical thrombectomy and has a non-ST elevation MI.   She was in cardiogenic shock and illicit drug use with cocaine.  She apparently had a prior stroke based on the medical record.  She was found down in her hotel room unresponsive.  She was significantly bradycardic on admission to the emergency room.  The patient was intubated.  She presented with a right hemiplegia after an MRI was performed, which had better visualization of the stroke.  Neurosurgery was consulted for potential neurosurgical intervention.  She was on a heparin drip until recently because of the NSTEMI, acute renal failure.  On examination, the patient is intubated, unresponsive, decerebrate on the right, moves somewhat slightly on the left spontaneously in the left upper extremity.  She has a fixed and dilated left pupil, which apparently has been like that for more than the last day and a large pupil on the right that does not appear reactive, although it is somewhat smaller than the left.  There is no gag reflex that I can elicit with the endotracheal tube.  I reviewed the CT scan and the MRI, and I discussed this at length with the nurse practitioner earlier this morning.  There really is no

## 2025-03-05 NOTE — PROGRESS NOTES
Neurointerventional Surgery Progress Note    Admit Date: 3/3/2025   LOS: 2 days      Daily Progress Note: 3/5/2025    Assessment:     Left M1 occlusion, suspected cervical left ICA dissection s/p mechanical thrombectomy with Dr. Bryan on 03/03/2025 TICI 2b     Plan:     - Blood pressure goals per primary team  - Neuro checks per primary team  - TTE, no thrombus, no PFO  - LDL 76.4, goal LDL<70. Continue atorvastain 80 mg daily  - A1c 5.2, mgmt per primary team  - PT/OT/SLP evals pending  - Stroke education  - Neurology following, appreciate recs     NIS will sign off, please call with any further questions.     HPI: Lurdes Brooks is a 64 y.o female with pmh of glaucoma, HTN, HLD, heavy smoker, and substance abuse who reportedly checked in at a hotel on 03/02/25. She failed to check out the following day 03/03/25 and staff found the patient unresponsive. EMS found drug paraphernalia in patient's room. In ED, HR was in the 30's w/ suspicion for possible MI after EKG. Cardiology consulted. Cardio recommended Dopamine and TTE. TTE obtained showed no signs for ACS. The patient was intubated for airway protection. Reportedly, prior to intubation, she was noted with right-sided weakness associated with left gaze preference and GCS of 6-7. Dropped SBP to 70's thus Dopamine was switched to Epinephrine. Patient tested + for cocaine. CTH showed no acute intracranial abnormalities except for subacute or chronic infarct in the right cerebellum and old lacunar infarcts in the bg. CTP showed perfusion mismatch and L MCA M1 occlusion on CTA h-n. The patient was emergently transferred to [facility] IR for thrombectomy of LMCA after Dr. Bryan with NIS reviewed vessel imaging.      Per patient's son, they recently moved this weekend and patient was living with son prior to the move. After the move, patient checked into a hotel. Patient's son admits patient is heavy smoker, uses illicit drugs with occasional marijuana use. Not on APT/AC or  commands.    Speech/Language:      WILLIAM patient intubated and not following commands.     Cranial Nerves:     Eyes not opening to voice today  L gaze preference, pupils unequal R>L R 4.05, L 6.72, and L pupil non-reactive.  R pupil sluggish. (Pupils have been waxing and waning with neuro exam) + nystagmus  WILLIAM facial asymmetry with ETT in place.    Motor:      Left side moves spontaneously, brisk withdrawal to noxious stimuli  No spontaneous movement on R-  withdraws to noxious stimuli in RLE, minimal withdrawal in RUE.   Bulk and tone normal.   No involuntary movements.    Sensation:      Sensation intact throughout to noxious stimuli    Coordination:   FTN and HTS deferred    Gait:   Deferred     24 hour results:  Recent Results (from the past 24 hour(s))   POCT Blood Gas & Electrolytes    Collection Time: 03/03/25  2:12 PM   Result Value Ref Range    PH, VENOUS (POC) 7.28 (L) 7.32 - 7.42      PCO2, Angelita, POC 53.1 (H) 41 - 51 MMHG    PO2, VENOUS (POC) <27 25 - 40 mmHg    HCO3, Arterial 25 mmol/L    Base Deficit (POC) 2.4 mmol/L    POC Sodium 146 (H) 136 - 145 MMOL/L    POC Potassium 2.9 (LL) 3.5 - 5.5 MMOL/L    POC Chloride 109 100 - 111 MMOL/L    POC TCO2 25 22 - 29 MMOL/L    Anion Gap, POC 12 10 - 20      POC Glucose 186 (H) 74 - 99 MG/DL    POC Creatinine 1.2 0.6 - 1.3 MG/DL    eGFR, POC 51 (L) >60 ml/min/1.73m2    POC Ionized Calcium 1.16 1.15 - 1.33 mmol/L    POC Lactic Acid 4.06 (HH) 0.40 - 2.00 mmol/L    Source VENOUS BLOOD     EKG 12 Lead    Collection Time: 03/03/25  2:16 PM   Result Value Ref Range    Ventricular Rate 39 BPM    Atrial Rate 39 BPM    P-R Interval 156 ms    QRS Duration 102 ms    Q-T Interval 566 ms    QTc Calculation (Bazett) 455 ms    P Axis 43 degrees    R Axis 43 degrees    T Axis 87 degrees    Diagnosis        Critical Test Result: Low HR , STEMI  Marked sinus bradycardia  ST elevation, consider inferior injury or acute infarct  ST elevation, consider anterior injury or acute

## 2025-03-05 NOTE — ACP (ADVANCE CARE PLANNING)
Advance Care Planning   Healthcare Decision Maker:    Primary Decision Maker: Varghese Brooks - Child - [phone]    The patient is not , no AMD on file - her only child Varghese is her LNOK and Medical Decision maker.           Evelyne Mtz, OBEDW

## 2025-03-05 NOTE — PROGRESS NOTES
Occupational Therapy  03/05/2025     Pt continues to follow patient.  Up to date on chart review.  Patient remains intubated and sedated, not medically appropriate for OT intervention at this time.  Will hold OT evaluation until patient is more medically appropriate and available for OT.    Thank you,  Sigrid Mendes OTD, OTR/L

## 2025-03-05 NOTE — PROGRESS NOTES
Spiritual Health History and Assessment/Progress Note  [facility]    Palliative Care,  ,  ,      Name: Lurdes Brooks MRN: [id number]    Age: 64 y.o.     Sex: female   Language: English   Christian: Episcopal   Acute cerebrovascular accident (CVA) ([facility])     Date: 3/5/2025            Total Time Calculated: 15 min              Spiritual Assessment began in [facility] 7S1 INTENSIVE CARE        Referral/Consult From: Palliative Care   Encounter Overview/Reason: Palliative Care  Service Provided For: Patient not available    Kimmy, Belief, Meaning:   Patient unable to assess at this time  Family/Friends No family/friends present      Importance and Influence:  Patient unable to assess at this time  Family/Friends No family/friends present    Community:  Patient   Family/Friends No family/friends present    Assessment and Plan of Care:     Patient Interventions include:   Family/Friends Interventions include: No family/friends present    Patient Plan of Care: Spiritual Care available upon further referral  Family/Friends Plan of Care: Spiritual Care available upon further referral    I attempted to visit Lurdes Brooks for a palliative initial spiritual health assessment.    The patient was on vent support. No family was present.     Electronically signed by ERIC WAGNER on 3/5/2025 at 1:53 PM

## 2025-03-05 NOTE — PROGRESS NOTES
Physical Therapy  03/05/2025     PT continues to follow patient.  Up to date on chart review.  Patient remains intubated and sedated, not medically appropriate for PT intervention at this time.  Will hold PT evaluation until patient is more medically appropriate and available for PT.    Thank you,  Jennifer Hunt, PT, DPT

## 2025-03-05 NOTE — PROGRESS NOTES
[facility] ICU Progress Note        Lurdes Brooks  1960  [id number]  3/5/2025    BRIEF PATIENT SUMMARY AND HOSPITAL COURSE     Lurdes Brooks is a 64 y.o. female with a PMH of HTN, glaucoma, previous stroke (unknown deficits/location) who was found in her hotel room unresponsive after she failed to check out on 3/3/2025. EMS found drug paraphernalia in the room. She presented to the ED at [facility] via EMS at about 1400. She was profoundly bradycardic to the 30s in the ED and unresponsive.   She was initially maintaining airway on 100% non re- breather. Initial EKG revealed ST segment elevation in all leads. Cardiology service was contacted first and an echocardiogram was performed which demonstrated no segmental wall motion abnormalities and normal-appearing LV function. Therefore, it was not felt that the EKG changes represented an acute coronary syndrome. Initial noncontrasted CT scan of the head was performed with no acute findings. UDS reported positive for cocaine.   Patient decompensated and she was intubated to get further imaging studies of her head.  Norepinephrine was changed to dopamine and when titrated to 20 mcg/kg/min her heart rate came up into the 80s. Prior to intubation, she was stuporous and seems to demonstrate right hemineglect with a left gaze preference. She withdrew her left upper and lower extremities from painful stimuli. There was no withdrawal in the right upper extremity and minimal withdrawal in the left lower extremity. GCS was 6 or 7. The patient again demonstrated hypotension despite being on dopamine of 20.  Dopamine was changed to epinephrine with improved HR and BP response. Cardiology was contacted by [facility] and agreement with this plan. CT imaging result came back and reported a LVO. [facility] was contacted and recommended emergent transfer to [facility] for neuro interventional procedure. Patient was transferred to [facility]. She had a mechanical thrombectomy with TICI 2B recanalization.     systolic function with a visually estimated EF of 60 - 65%. Left ventricle size is normal. Normal wall motion.    Mitral Valve: Mild regurgitation.    Signed by: Jeevan Ray MD on 3/4/2025 10:49 AM      /84   Pulse 72   Temp 99.8 °F (37.7 °C)   Resp 20   Ht 1.651 m (5' 5\")   Wt 50.8 kg (112 lb)   SpO2 97%   BMI 18.64 kg/m²      Temp (24hrs), Av °F (37.8 °C), Min:99 °F (37.2 °C), Max:101 °F (38.3 °C)           Intake/Output:     Intake/Output Summary (Last 24 hours) at 3/5/2025 0755  Last data filed at 3/5/2025 0700  Gross per 24 hour   Intake 2262.31 ml   Output 700 ml   Net 1562.31 ml         CCM time:   45 mins.  This patient is critically ill with risk of clinical deterioration.  The documented time was time spent providing direct patient care, formulating care plan and discussing this plan with other providers, updating family and discussing goals of care with patient and/or family. It does not include time spent performing any procedures that are billed separately.    Tutu Stroud PA-C    Critical Care Medicine  3/5/2025

## 2025-03-05 NOTE — CONSULTS
Palliative Medicine  Patient Name: Lurdes Brooks  YOB: 1960  MRN: [id number]  Age: 64 y.o.  Gender: female    Date of Initial Consult: 3/5/2025  Date of Service: 3/5/2025  Time: 11:28 AM  Provider: Ayesha Mahan MD  Hospital Day: 3  Admit Date: 3/3/2025  Referring Provider: Dr. Carlota Liz        Reasons for Consultation:  Goals of Care    HISTORY OF PRESENT ILLNESS (HPI):   Lurdes Brooks is a 64 y.o. female with a past medical history of polysubstance use disorder, HTN, vision impaired R eye, who was admitted on 3/3/2025 from home with a diagnosis of found down in a hotel on 3/3 with drug paraphernalia around, HR 30s.  Found to have acute MI (troponins 64, 000) also Head CT revealed acute Left M1 occlusion s/p mechanical thrombectomy Dr. Bryan 3/3/25 T1C1 2B  .   MRI Brain 3/5 : IMPRESSION:  Subacute Infarct of the majority of the left MCA territory. New cerebral edema  with subfalcine midline shift and uncal herniation. Mild mass effect on the  brainstem.  Probable petechial hemorrhage in the left frontal and temporal lobes.  Small punctate infarcts in the cerebellum, right occipital lobe, and right  parietal lobe.    Psychosocial: patient is single  SonVarghese [phone]     Sister, Lorena Felipe [phone]  Family describes patient as very funny, loved to sing/dance  She has 3 grandchildren ages 11, 13, 16      PALLIATIVE DIAGNOSES:    Acute  stroke  Repeat MRI shows cerebral edema/ mass effect/ uncal herniation  Acute MI  Altered cognition  Polysubstance use disorder   fever  Poor prognosis  Palliative medicine encounter  Care goals discussion    ASSESSMENT AND PLAN:   Discussed with ICU team  Met with patient's son, Varghese, and also joining some of meeting her sister Lorena, nephew Umer, and patient's aunt.  Introduced palliative medicine as added layer of support in serious illness  Varghese has met with ICU team today, understands that she has poor outlook for recovery.  Now with brain edema  include time spent in any separately reportable services.    Electronically signed by   Ayesha Mahan MD  Palliative Care Team  on 3/5/2025 at 11:28 AM

## 2025-03-05 NOTE — PROGRESS NOTES
Palliative Medicine      Code Status: Full Code    Advance Care Planning:    No AMD on file - patient is not , her NOK is her son Varghese - only child.     Patient / Family Encounter Documentation    Participants (names): Dr. Mahan, son Varghese, sister Alix, nephew Armando, and patient's aunt     Narrative: The Palliative Medicine SW and Dr. Mahan met with the patient's son Varghese, along with sister Alix - additional family also entered during encounter, to include nephew Armando and patient's aunt.     Palliative Medicine introduced the role of Palliative as added support, helping family navigate all that is going on with their loved one. See psychosocial information below, family is processing so much information.     We explore Varghese's understanding of what he has heard so far - he acknowledges and is able to verbalize he needs to make decisions regarding possible tracheostomy, DNR, and shares that his mother has swelling on the brain. Family asks if there is anything we can do for the swelling and unfortunately there are not interventions that can \"fix\" the swelling. The patient's sister Alix had to step out of the room - she shared the meeting and information was too much for her.     We discussed with Varghese overall clinical picture - discuss although there are always some unknowns, do not anticipate that the patient will wake up and have meaningful recovery - we discuss unlikely she will be able to feed herself, talk, interact, and may not wake up purposefully. We discuss that the longer time goes by - the more unlikely it becomes there will be meaningful recovery.     Family also was processing information on tracheostomy - we clarified and explained in-detail, a tracheostomy Would NOT fix her clinical picture - it is just a way for her to remain longer term on ventilator support/easier access to breathing machine - this does not impact her brain injury and/or heart.     We discuss in-detail

## 2025-03-05 NOTE — PROGRESS NOTES
Neurology follow up:     65 yo F h/o HTN, HLD, smoking, substance abuse found down in a hotel on 3/3. Drug paraphernalia onsite. In ED, cristian to 30s with concern for MI, now on heparin. CT neuroimaging showed L M1 occlusion s/p EVT TICI 2B. UDS positive for cocaine. 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 Cardiology following for elevated trops, c/f NSTEMI, with consideration of cath; however, given extent of neurological injury, decision made to defer.    Interval events:  See assessment       Exam:  Physical Exam:  General: Ill appearing patient in mild distress.   Pulmonary: Intubated  Cardiac: Regular rate and rhythm  Extremities: No cyanosis or edema     Neurological Exam: Off sedation  Mental Status: Flickers eyelids intermittently to voice, spontaneous movements on the L, no command following   Cranial Nerves:   R pupil NPI 2.3 but irregular and grossly unreactive, L pupil NPI 0, dysconjugate gaze   Motor:  Spontaneous semi purposeful movements on the L, no response to noxious in RUE, flicker to noxious RLE   Reflexes:      Sensory:   Noxious as above   Gait:      Cerebellar:         Neuroimaging: personally reviewed     CTA HEAD NECK W CONTRAST     Result Date: 3/3/2025  Left middle cerebral artery M1 segment occlusion with 49 cc core infarct and 87 cc ischemic penumbra. Electronically signed by KARRI HARDY     CT BRAIN PERFUSION     Result Date: 3/3/2025  Left middle cerebral artery M1 segment occlusion with 49 cc core infarct and 87 cc ischemic penumbra. Electronically signed by KARRI HARDY     CT HEAD WO CONTRAST     Result Date: 3/3/2025  1.  No acute intracranial abnormalities. 2.  Subacute or chronic infarct in the right cerebellum is new since the previous study from 2016. 3.  Chronic small vessel disease with old lacunar infarcts in the basal ganglia. Electronically signed by LIVIA Deleon    MRI brain without contrast    Result Date: 3/5/2025  Subacute Infarct of the majority of the left  MCA territory. New cerebral edema with subfalcine midline shift and uncal herniation. Mild mass effect on the brainstem. Probable petechial hemorrhage in the left frontal and temporal lobes. Small punctate infarcts in the cerebellum, right occipital lobe, and right parietal lobe. I discussed the cerebral edema and mass effect with ICU RN Kaylah at 0320 hours on 3/5/2025. Electronically signed by Isaac Grant        AP:  63 yo F h/o HTN, HLD, smoking, substance abuse found down in a hotel on 3/3. Drug paraphernalia onsite. In ED, cristian to 30s with concern for MI, now on heparin. CT neuroimaging showed L M1 occlusion s/p EVT TICI 2B. UDS positive for cocaine. 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 Cardiology following for elevated trops, c/f NSTEMI, with consideration of cath; however, given extent of neurological injury, decision made to defer. Exam notable for spontaneous semipurposeful movement on the L but not to command, flicker to noxious on the R, R pupil unreactive, L pupil sluggish, L gaze deviation opens eyes to voice. Etiology possibly atherosclerotic vs embolic.     3/5:  L pupil unreactive, no following any commands, no verbal output. MRI brain showed very large L cerebral infarct, with concern for subfalcine and uncal herniation. No intervention per NSG given comorbidities and poor prognosis. Discussed with son at bedside that it would be reasonable to transition to comfort care at this time and also encouraged transition to DNR. Son was not ready to make these decisions at this time but will think about them.     - Management of MI per cardiology/primary team  - OK to hold ASA pending clinical course but recommend starting 81 mg daily as soon as feasible  - Start atorvastatin 80 mg daily  - Neurochecks/BP goals per post EVT protocol  - TTE no thrombus, no PFO  - A1c 5.2, LDL 76  - Wean sedation and extubate as tolerated per primary team  - PT/OT/SLP as tolerated  - NSG consulted; no  intervention  - Palliative consulted; appreciate assistance     Frantz Bartlett MD  Neurology

## 2025-03-05 NOTE — PROGRESS NOTES
0320: Radiologist MD called to report cerebral edema, uncal herniation, mass effect, and 10mm midline shift.     0325: Report MRI findings to Tutu Conner NP. Per SUDHEER Cam Heparin gtt stopped and NP will be calling [facility]. Exam remains unchanged from previous assessments.

## 2025-03-05 NOTE — PLAN OF CARE
Problem: Safety - Medical Restraint  Goal: Remains free of injury from restraints (Restraint for Interference with Medical Device)  Description: INTERVENTIONS:  1. Determine that other, less restrictive measures have been tried or would not be effective before applying the restraint  2. Evaluate the patient's condition at the time of restraint application  3. Inform patient/family regarding the reason for restraint  4. Q2H: Monitor safety, psychosocial status, comfort, nutrition and hydration  Outcome: Progressing     Problem: Chronic Conditions and Co-morbidities  Goal: Patient's chronic conditions and co-morbidity symptoms are monitored and maintained or improved  Outcome: Progressing     Problem: Discharge Planning  Goal: Discharge to home or other facility with appropriate resources  Outcome: Progressing     Problem: Pain  Goal: Verbalizes/displays adequate comfort level or baseline comfort level  Outcome: Progressing     Problem: Safety - Adult  Goal: Free from fall injury  Outcome: Progressing     Problem: Nutrition Deficit:  Goal: Optimize nutritional status  Outcome: Progressing

## 2025-03-05 NOTE — PROGRESS NOTES
MRI came back with severe cerebral edema and herniation with midline shift.  Petechial microhemorrhages also found on MRI.  Stopped neuro protocol heparin drip, was on it for NSTEMI.  Called neurosurgeon on-call and discussed case including new MRI results.  Also reported patient's neurological status.  Positive cough and gag.  Spontaneously moves the left side.  Flaccid in upper right extremity.  Withdraws to pain only in the lower right extremity.  Pupils are and equal right is a 6 mm and fixed left is 5.3 mm and sluggish.  This was confirmed with the pupillometer.  Pupillary response has been waxing and waning and changes from hour to hour over the last 12 hours in both eyes.  After discussion with current medical status and neurological assessment NSTEMI patient was deemed a poor candidate for any surgical intervention at this time.  Neurosurgery will see in the morning.  Neurology also aware of MRI results.     MRI without contrast of brain 3/5/2025  IMPRESSION:  Subacute Infarct of the majority of the left MCA territory. New cerebral edema  with subfalcine midline shift and uncal herniation. Mild mass effect on the  brainstem.  Probable petechial hemorrhage in the left frontal and temporal lobes.  Small punctate infarcts in the cerebellum, right occipital lobe, and right  parietal lobe.    CCT 30 mins    Messi Cam, NP-C    Critical Care Medicine  [facility]

## 2025-03-05 NOTE — CARDIO/PULMONARY
Cardiac rehab: following for elevated cardiac enzymes, review of chart indicates pt with NSTEMI. Pt not candidate for cardiac rehab due to acute stroke, polysubstance abuse and palliative on board.

## 2025-03-06 NOTE — PROGRESS NOTES
Speech Language Pathology Note    Chart reviewed in anticipation of SLP evaluation. Patient remains intubated. SLP to sign off at this time. Please re-consult when patient extubated and able/appropriate to participate in evaluation.     Thank you,  Sandie Portillo M.Ed, CCC-SLP (pronouns: she/her/hers)  Speech-Language Pathologist

## 2025-03-06 NOTE — PLAN OF CARE
Problem: Safety - Medical Restraint  Goal: Remains free of injury from restraints (Restraint for Interference with Medical Device)  Description: INTERVENTIONS:  1. Determine that other, less restrictive measures have been tried or would not be effective before applying the restraint  2. Evaluate the patient's condition at the time of restraint application  3. Inform patient/family regarding the reason for restraint  4. Q2H: Monitor safety, psychosocial status, comfort, nutrition and hydration  Outcome: Progressing     Problem: Chronic Conditions and Co-morbidities  Goal: Patient's chronic conditions and co-morbidity symptoms are monitored and maintained or improved  Outcome: Progressing     Problem: Discharge Planning  Goal: Discharge to home or other facility with appropriate resources  Outcome: Progressing     Problem: Pain  Goal: Verbalizes/displays adequate comfort level or baseline comfort level  Outcome: Progressing     Problem: Safety - Adult  Goal: Free from fall injury  Outcome: Progressing     Problem: Nutrition Deficit:  Goal: Optimize nutritional status  3/6/2025 1851 by Tyree Alvarez, RN  Outcome: Progressing  3/6/2025 1354 by Karmen Bolaños, RD  Outcome: Progressing

## 2025-03-06 NOTE — PROGRESS NOTES
Neurology follow up:     63 yo F h/o HTN, HLD, smoking, substance abuse found down in a hotel on 3/3. Drug paraphernalia onsite. In ED, cristian to 30s with concern for MI, now on heparin. CT neuroimaging showed L M1 occlusion s/p EVT TICI 2B. UDS positive for cocaine. 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 Cardiology following for elevated trops, c/f NSTEMI, with consideration of cath; however, given extent of neurological injury, decision made to defer.    Interval events:  See assessment       Exam:  Physical Exam:  General: Ill appearing patient in mild distress.   Pulmonary: Intubated  Cardiac: Regular rate and rhythm  Extremities: No cyanosis or edema     Neurological Exam: Off sedation since 3/3 pm  Mental Status: No response to voice, no eye opening, no spontaneous movements   Cranial Nerves:   Pupils large, unreactive bilaterally, dysconjugate gaze, corneals absent, cough intact   Motor:  Withdrawal in LLE, possible flicker in LUE and RLE, no response RUE   Reflexes:      Sensory:   Noxious as above   Gait:      Cerebellar:         Neuroimaging: personally reviewed     CTA HEAD NECK W CONTRAST     Result Date: 3/3/2025  Left middle cerebral artery M1 segment occlusion with 49 cc core infarct and 87 cc ischemic penumbra. Electronically signed by [employer]     CT BRAIN PERFUSION     Result Date: 3/3/2025  Left middle cerebral artery M1 segment occlusion with 49 cc core infarct and 87 cc ischemic penumbra. Electronically signed by [employer]     CT HEAD WO CONTRAST     Result Date: 3/3/2025  1.  No acute intracranial abnormalities. 2.  Subacute or chronic infarct in the right cerebellum is new since the previous study from 2016. 3.  Chronic small vessel disease with old lacunar infarcts in the basal ganglia. Electronically signed by LIVIA Deleon    MRI brain without contrast    Result Date: 3/5/2025  Subacute Infarct of the majority of the left MCA territory. New cerebral edema with subfalcine  midline shift and uncal herniation. Mild mass effect on the brainstem. Probable petechial hemorrhage in the left frontal and temporal lobes. Small punctate infarcts in the cerebellum, right occipital lobe, and right parietal lobe. I discussed the cerebral edema and mass effect with ICU RN Kaylah at 0320 hours on 3/5/2025. Electronically signed by Isaac Grant        AP:  63 yo F h/o HTN, HLD, smoking, substance abuse found down in a hotel on 3/3. Drug paraphernalia onsite. In ED, cristian to 30s with concern for MI, now on heparin. CT neuroimaging showed L M1 occlusion s/p EVT TICI 2B. UDS positive for cocaine. 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 Cardiology following for elevated trops, c/f NSTEMI, with consideration of cath; however, given extent of neurological injury, decision made to defer. Exam notable for spontaneous semipurposeful movement on the L but not to command, flicker to noxious on the R, R pupil unreactive, L pupil sluggish, L gaze deviation opens eyes to voice. Etiology possibly atherosclerotic vs embolic.     3/5:  L pupil unreactive, no following any commands, no verbal output. MRI brain showed very large L cerebral infarct, with concern for subfalcine and uncal herniation. No intervention per NSG given comorbidities and poor prognosis. Discussed with son at bedside that it would be reasonable to transition to comfort care at this time and also encouraged transition to DNR. Son was not ready to make these decisions at this time but will think about them.    3/6:  Evidence of ongoing herniation on exam, not a surgical candidate. Prognosis grave. Recommend comfort measures only.     - Recommend comfort measures only/withdrawal of care  - Palliative following; appreciate assistance    We will sign off at this time.     Frantz Bartlett MD  Neurology

## 2025-03-06 NOTE — PROGRESS NOTES
NUTRITION brief    Recommendations:     -Maintain tube feeding rate @ 35 ml/hr today    -If Phosphorus WNL tomorrow recommend increasing rate by 10 ml q 12 hr to goal 45 ml/hr        Diet: NPO  Supplements/Nutrition Support: Jevity 1.5 @ 35 ml/hr; advance by 10 ml q 12 hr to goal 45 ml/hr with 50 ml water flush q 4 hr  Nutrition-related meds: Humalog, Pepcid, K Phos Neutral, Glycolax, Senokot-S    New events impacting nutrition plan of care:   Tube feeding slowly being advanced to goal. Phosphorous trended down significantly in past 24 hr. Suggest replacing phosphorous and maintain current rate of tube feeding until lab is WNL.     Last BM PTA; bowel regimen ordered today.      See full RD assessment from 3/4 for additional details, goals, and monitoring/evaluation.   Estimated Nutrition Needs:   Energy Requirements Based On: Kcal/kg  Weight Used for Energy Requirements: Current  Energy (kcal/day): 4554-3874 (25-30 kcal/kg)  Weight Used for Protein Requirements: Current  Protein (g/day): 66-76 (1.3-1.5g/kg)  Method Used for Fluid Requirements: 1 ml/kcal  Fluid (ml/day): 1525    Karmen Bolaños RD [facility]

## 2025-03-06 NOTE — PROGRESS NOTES
Physical Therapy  03/06/2025    PT continues to follow patient.  Up to date on chart review.  Patient remains intubated and sedated with worsening neuro exam overnight and remains inappropriate for PT intervention.  Due to ongoing medical instability, PT will sign-off.  Please re-consult should medical status improve.      Thank you,  Jennifer Hunt, PT, DPT

## 2025-03-06 NOTE — CONSULTS
Palliative Medicine  Patient Name: Lurdes Brooks  YOB: 1960  MRN: [id number]  Age: 64 y.o.  Gender: female    Date of Initial Consult: 3/5/2025  Date of Service: 3/6/2025  Time: 2:07 PM  Provider: Ayesha Mahan MD  Hospital Day: 4  Admit Date: 3/3/2025  Referring Provider: Dr. Carlota Liz        Reasons for Consultation:  Goals of Care    HISTORY OF PRESENT ILLNESS (HPI):   Lurdes Brooks is a 64 y.o. female with a past medical history of polysubstance use disorder, HTN, vision impaired R eye, who was admitted on 3/3/2025 from home with a diagnosis of found down in a hotel on 3/3 with drug paraphernalia around, HR 30s.  Found to have acute MI (troponins 64, 000) also Head CT revealed acute Left M1 occlusion s/p mechanical thrombectomy Dr. Bryan 3/3/25 T1C1 2B  .   MRI Brain 3/5 : IMPRESSION:  Subacute Infarct of the majority of the left MCA territory. New cerebral edema  with subfalcine midline shift and uncal herniation. Mild mass effect on the  brainstem.  Probable petechial hemorrhage in the left frontal and temporal lobes.  Small punctate infarcts in the cerebellum, right occipital lobe, and right  parietal lobe.    Psychosocial: patient is single  SonVarghese [phone]     Sister, Lorena Felipe [phone]  Family describes patient as very funny, loved to sing/dance  She has 3 grandchildren ages 11, 13, 16      PALLIATIVE DIAGNOSES:    Acute  stroke  Repeat MRI shows cerebral edema/ mass effect/ uncal herniation  Acute MI  Altered cognition  Polysubstance use disorder   fever  Poor prognosis  Palliative medicine encounter  Care goals discussion    ASSESSMENT AND PLAN:   Discussed with ICU team, worsening neuro status (pupils fixed  Met with sonVarghese.   Explained clinical update- ongoing neurologic decline.    Recommendation made for protection from CPR   Varghese agrees to DNR protection-- to allow a natural passing if she dies despite all we are doing.    More family to visit, recommend they visit  Anxiety:      ESAS Depression:          LAB AND IMAGING FINDINGS:   Objective data reviewed:  labs, images, records, medication use, vitals, and chart     FINAL COMMENTS   Thank you for allowing Palliative Medicine to participate in the care of Lurdes Brooks.    Only check if applicable and billing time based rather than MDM  [x] The total encounter time on this service date was __45__ minutes which was spent performing a face-to-face encounter and personally completing the provider-level activities documented in the note. This includes time spent prior to the visit and after the visit in direct care of the patient. This time does not include time spent in any separately reportable services.    Electronically signed by   Ayesha Mahan MD  Palliative Care Team  on 3/6/2025 at 2:07 PM

## 2025-03-06 NOTE — PROGRESS NOTES
Occupational Therapy  03/06/2025    OT continues to follow patient.  Up to date on chart review.  Patient remains intubated and sedated with worsening neuro exam overnight and remains inappropriate for OT intervention.  Due to ongoing medical instability, OT will sign-off.  Please re-consult should medical status improve.      Thank you,  Sigrid Mendes OTD, OTR/L

## 2025-03-06 NOTE — CARE COORDINATION
Transition of Care Plan:    RUR: 16%   Disposition: Independent   DANIELLE:   Date authorization started with reference number:   Date authorization received and expires:   Follow up appointments: PCP, Specialists   DME needed: NA  Transportation at discharge: TBD  IM/IMM Medicare/ letter given: NA  Is patient a  and connected with VA? No   Caregiver Contact: Praneeth Brooks [phone]  Discharge Caregiver contacted prior to discharge? Yes  Care Conference needed? No  Barriers to discharge:    Remains intubated   Neuro status worsening   Palliative team following for GOC.  Kaylah Feliciano RN,Care Management

## 2025-03-06 NOTE — CONSULTS
Palliative Medicine  Patient Name: Lurdes Brooks  YOB: 1960  MRN: [id number]  Age: 64 y.o.  Gender: female    Date of Initial Consult: 3/5/2025  Date of Service: 3/6/2025  Time: 9:44 AM  Provider: Ayesha Mahan MD  Hospital Day: 4  Admit Date: 3/3/2025  Referring Provider: Dr. Carlota Liz        Reasons for Consultation:  Goals of Care    HISTORY OF PRESENT ILLNESS (HPI):   Lurdes Brooks is a 64 y.o. female with a past medical history of polysubstance use disorder, HTN, vision impaired R eye, who was admitted on 3/3/2025 from home with a diagnosis of found down in a hotel on 3/3 with drug paraphernalia around, HR 30s.  Found to have acute MI (troponins 64, 000) also Head CT revealed acute Left M1 occlusion s/p mechanical thrombectomy Dr. Bryan 3/3/25 T1C1 2B  .   MRI Brain 3/5 : IMPRESSION:  Subacute Infarct of the majority of the left MCA territory. New cerebral edema  with subfalcine midline shift and uncal herniation. Mild mass effect on the  brainstem.  Probable petechial hemorrhage in the left frontal and temporal lobes.  Small punctate infarcts in the cerebellum, right occipital lobe, and right  parietal lobe.    Psychosocial: patient is single  SonVarghese [phone]     Sister, Lorena Matteo [phone]  Family describes patient as very funny, loved to sing/dance  She has 3 grandchildren ages 11, 13, 16      PALLIATIVE DIAGNOSES:    Acute  stroke  Repeat MRI shows cerebral edema/ mass effect/ uncal herniation  Acute MI  Altered cognition  Polysubstance use disorder   fever  Poor prognosis  Palliative medicine encounter  Care goals discussion    ASSESSMENT AND PLAN:   Discussed with ICU team, worsening neuro status (pupils fixed)   Anticipate decision today from patient's son Varghese about code status  Please notify our team when he is at bedside today so we can make supportive visit      On 3/5/25 Met with patient's son, Varghese, and also joining some of meeting her sister Lorena, nephaleksandar Owusu, and  face-to-face encounter and personally completing the provider-level activities documented in the note. This includes time spent prior to the visit and after the visit in direct care of the patient. This time does not include time spent in any separately reportable services.    Electronically signed by   Ayesha Mahan MD  Palliative Care Team  on 3/6/2025 at 9:44 AM

## 2025-03-06 NOTE — PROGRESS NOTES
[facility] Progress Note        Lurdes Brooks  1960  [id number]  3/6/2025    BRIEF PATIENT SUMMARY AND HOSPITAL COURSE     Lurdes Brooks is a 64 y.o. female with a PMH of HTN, glaucoma, previous stroke (unknown deficits/location) who was found in her hotel room unresponsive after she failed to check out on 3/3/2025. EMS found drug paraphernalia in the room. She presented to the ED at [facility] via EMS at about 1400. She was profoundly bradycardic to the 30s in the ED and unresponsive.   She was initially maintaining airway on 100% non re- breather. Initial EKG revealed ST segment elevation in all leads. Cardiology service was contacted first and an echocardiogram was performed which demonstrated no segmental wall motion abnormalities and normal-appearing LV function. Therefore, it was not felt that the EKG changes represented an acute coronary syndrome. Initial noncontrasted CT scan of the head was performed with no acute findings. UDS reported positive for cocaine.   Patient decompensated and she was intubated to get further imaging studies of her head.  Norepinephrine was changed to dopamine and when titrated to 20 mcg/kg/min her heart rate came up into the 80s. Prior to intubation, she was stuporous and seems to demonstrate right hemineglect with a left gaze preference. She withdrew her left upper and lower extremities from painful stimuli. There was no withdrawal in the right upper extremity and minimal withdrawal in the left lower extremity. GCS was 6 or 7. The patient again demonstrated hypotension despite being on dopamine of 20.  Dopamine was changed to epinephrine with improved HR and BP response. Cardiology was contacted by [facility] and agreement with this plan. CT imaging result came back and reported a LVO. [facility] was contacted and recommended emergent transfer to [facility] for neuro interventional procedure. Patient was transferred to [facility]. She had a mechanical thrombectomy with TICI 2B recanalization.     intracranial aneurysm or hemodynamically significant stenosis.    CT Perfusion:  Matched perfusion defect in the left MCA territory measuring 49 cc with an 87 cc  ischemic penumbra.    Impression  Left middle cerebral artery M1 segment occlusion with 49 cc core infarct and 87  cc ischemic penumbra.    Electronically signed by KARRI HARDY    Most Recent MRI  MRI BRAIN WO CONTRAST 03/05/2025    Narrative  EXAM: MRI BRAIN WO CONTRAST    INDICATION: Left MCA acute stroke treated with endovascular procedure.    COMPARISON: CT head and CT angiography head on 3/3/2025. MRI brain on 8/9/2011.    CONTRAST: None.    TECHNIQUE:  Multiplanar multisequence acquisition without contrast of the brain.    FINDINGS:  Large volume restricted diffusion in the left MCA territory involves the left  frontal lobe, left temporal lobe, left parietal lobe, left occipital lobe, left  insula, left basal ganglia, left thalamus. Multifocal small infarcts in the  bilateral cerebellar hemispheres and right occipital lobe. Small focus of  restricted diffusion in the right parietal lobe. Susceptibility artifact is in  the left temporal and frontal lobes.    Cerebral edema on the left. Left to right subfalcine midline shift measures 10  mm. There is uncal herniation with early mass effect on the brainstem. There is  no cerebellar tonsillar herniation. No visible left MCA flow void.    No tonsillar herniation.    Impression  Subacute Infarct of the majority of the left MCA territory. New cerebral edema  with subfalcine midline shift and uncal herniation. Mild mass effect on the  brainstem.  Probable petechial hemorrhage in the left frontal and temporal lobes.  Small punctate infarcts in the cerebellum, right occipital lobe, and right  parietal lobe.  I discussed the cerebral edema and mass effect with ICU RN Kaylah at 0320 hours on  3/5/2025.      Electronically signed by Isaac Grant    Most Recent Echo  03/03/25    ECHO (TTE) COMPLETE (PRN  CONTRAST/BUBBLE/STRAIN/3D) 2025 10:49 AM (Final)    Interpretation Summary    Bubble study: No interatrial shunt visualized with color Doppler. Agitated saline study was negative with and without provocation.    Left Ventricle: Normal left ventricular systolic function with a visually estimated EF of 60 - 65%. Left ventricle size is normal. Normal wall motion.    Mitral Valve: Mild regurgitation.    Signed by: Jeevan Ray MD on 3/4/2025 10:49 AM      /86   Pulse 74   Temp 99.3 °F (37.4 °C)   Resp 26   Ht 1.651 m (5' 5\")   Wt 53.8 kg (118 lb 9.7 oz)   SpO2 98%   BMI 19.74 kg/m²      Temp (24hrs), Av.8 °F (37.1 °C), Min:97 °F (36.1 °C), Max:101.6 °F (38.7 °C)           Intake/Output:     Intake/Output Summary (Last 24 hours) at 3/6/2025 0713  Last data filed at 3/6/2025 0700  Gross per 24 hour   Intake 3308.12 ml   Output 2185 ml   Net 1123.12 ml         CCM time:   45 mins.  This patient is critically ill with risk of clinical deterioration.  The documented time was time spent providing direct patient care, formulating care plan and discussing this plan with other providers, updating family and discussing goals of care with patient and/or family. It does not include time spent performing any procedures that are billed separately.    Tutu Stroud PA-C    Critical Care Medicine  3/6/2025

## 2025-03-06 NOTE — PROGRESS NOTES
[place] [phone] (COPE)  [facility] [phone] (COPE)      GOALS OF CARE: DNR, continue current care - anticipate transition to comfort focused care in next day or so, family wants to come say their goodbyes. Son is aware patient could die at anytime.         TREATMENT PREFERENCES:   Code Status: DNR    Advance Care Planning:  [x] [facility] Interdisciplinary Team has updated the ACP Navigator with Health Care Agent and Patient Capacity    Primary Decision Maker (Health Care Agent):   Relationship to patient:  [] Named in a scanned document   [x] Legal Next of Kin  [] Guardian      Family Meeting Documentation    Participants: Dr. Mahan, son Varghese, Varghese's significant other, PAOLA Carson    Discussion: The Palliative Medicine SW and Dr. Mahan met with the patient's son Varghese, along with his significant other and PAOLA Carson. ICU PA gave updates on worsening neurological status.    We discussed together the patient's poor prognosis, unfortunately despite best efforts patient's neurological status has been worsening. We discuss the medical recommendation for DNR and also discuss in-detail recommendation to transition to comfort focused care.     Varghese is stoic, he is quiet and appears pensive - he shares that he has been expecting bad news and that it sounds like a \"downward spiral\" for his mother unfortunately.     SW discussed what comfort focused care would look like - removal of ventilator / artificial support and treat pain, anxiety, and shortness of breath and allow her to die comfortably and look to her for symptoms.     Varghese is processing all information - he is not ready to make transition to comfort focused care yet. He wants to inform his family and give them opportunity to be involved and visit the patient, however, anticipate he will consider this transition likely in upcoming days if patient survives.     Varghese agrees to DNR.     We also inform Varghese that anyone who

## 2025-03-07 NOTE — DISCHARGE SUMMARY
[facility]                                                                                         Death Discharge Summary     Patient: Lurdes Brooks       MRN: [id number]       YOB: 1960       Age: 64 y.o.   Admitting Provider:  Keyur Guillen MD  Admission Diagnosis: Acute cerebrovascular accident (CVA) ([facility]) [I63.9]  Cerebrovascular accident (CVA), unspecified mechanism ([facility]) [I63.9]    Date of Death:   Time of Death:     Physical Exam:  Pupils: Fixed, Dilated   Lungs: Absent lung sounds   Cardiac: Absent heart sounds, no pulses    Hospital Course:  Lurdes Brooks is a 64 y.o. female with a PMH of HTN, glaucoma, previous stroke (unknown deficits/location) who was found in her hotel room unresponsive after she failed to check out on 3/3/2025. EMS found drug paraphernalia in the room. She presented to the ED at [facility] via EMS at about 1400. She was profoundly bradycardic to the 30s in the ED and unresponsive.   She was initially maintaining airway on 100% non re- breather. Initial EKG revealed ST segment elevation in all leads. Cardiology service was contacted first and an echocardiogram was performed which demonstrated no segmental wall motion abnormalities and normal-appearing LV function. Therefore, it was not felt that the EKG changes represented an acute coronary syndrome. Initial noncontrasted CT scan of the head was performed with no acute findings. UDS reported positive for cocaine.   Patient decompensated and she was intubated to get further imaging studies of her head.  Norepinephrine was changed to dopamine and when titrated to 20 mcg/kg/min her heart rate came up into the 80s. Prior to intubation, she was stuporous and seems to demonstrate right hemineglect with a left gaze preference. She withdrew her left upper and lower extremities from painful stimuli. There was no withdrawal in the right upper extremity and minimal withdrawal in the left lower extremity. GCS was 6 or 7. The

## 2025-03-07 NOTE — PROGRESS NOTES
Spiritual Health History and Assessment/Progress Note  [facility]    Attempted Encounter,  , Death,      Name: Lurdes Brooks MRN: [id number]    Age: 64 y.o.     Sex: female   Language: English   Adventist: Spiritism   Acute cerebrovascular accident (CVA) ([facility])     Date: 3/7/2025            Total Time Calculated: 15 min              Spiritual Assessment continued in [facility] 7S1 INTENSIVE CARE        Referral/Consult From: Nurse   Encounter Overview/Reason: Attempted Encounter  Service Provided For: Patient not available    Kimym, Belief, Meaning:   Patient unable to assess at this time  Family/Friends No family/friends present      Importance and Influence:  Patient unable to assess at this time  Family/Friends No family/friends present    Community:  Patient   Family/Friends No family/friends present    Assessment and Plan of Care:     Patient Interventions include:   Family/Friends Interventions include: No family/friends present    Patient Plan of Care:   Family/Friends Plan of Care: No family/friends present    While on call I was notified of patient's death by her nurse. The nurse informed me that the family declined a  visit.     Electronically signed by ERIC WAGNER on 3/7/2025 at 6:40 AM

## 2025-03-07 NOTE — DEATH NOTES
Death Pronouncement Note    Patient's Name: Lurdes Brooks   Patient's YOB: 1960  MRN Number: [id number]    Admitting Provider: Keyur Guillen MD  Attending Provider: Carlota Liz MD           Pt Name  Lurdes Brooks   Admit date:  3/3/2025   Date and time of death:  3/6/2025 @ 1945   Room Number  7108/01    Medical Record Number  [id number] @ [facility]   Age  64 y.o.   Date of Birth 1960   PCP No primary care provider on file.   Attending physician Carlota Liz MD      Code Status  DNR/Comfort Care   Patient seen and examined     Mental status   Unresponsive    Pupils Dilated and Fixed    Respiration Nil    Pulse  Absent     Heart Sounds  Absent    Rhythm  Asystole   Family  At bedside   Chaplan Service  Notified by Nursing staff                                 3/6/2025     PAOLA Redmond    Critical Care Medicine  [facility]

## 2025-03-07 NOTE — PROGRESS NOTES
Increasing hemodynamic instability. Hypotensive, hypoxic.     Made DNR by son earlier today after conversations with ICU team and palliative care.     Son would like for palliative extubation. Comfort care orders placed.

## 2025-03-07 NOTE — PROGRESS NOTES
1945: Patient asystole on monitor. PAOLA Connell at bedside and declares death at 1945.     1950: Nursing supervisors made aware of passing.  paged.     1958: Family does not request visitation from  on call.  updated on family request.       n/a

## 2025-03-09 LAB
BACTERIA SPEC CULT: NORMAL
SERVICE CMNT-IMP: NORMAL

## 2025-03-11 LAB — ECHO LV EF PHYSICIAN: 60 %

## 2025-03-13 LAB
BACTERIA SPEC CULT: NORMAL
SERVICE CMNT-IMP: NORMAL